# Patient Record
Sex: FEMALE | Race: WHITE | NOT HISPANIC OR LATINO | URBAN - METROPOLITAN AREA
[De-identification: names, ages, dates, MRNs, and addresses within clinical notes are randomized per-mention and may not be internally consistent; named-entity substitution may affect disease eponyms.]

---

## 2022-09-19 ENCOUNTER — DOCUMENTATION (OUTPATIENT)
Dept: PSYCHIATRY | Facility: CLINIC | Age: 57
End: 2022-09-19

## 2022-09-19 ENCOUNTER — TELEMEDICINE (OUTPATIENT)
Dept: BEHAVIORAL/MENTAL HEALTH CLINIC | Facility: CLINIC | Age: 57
End: 2022-09-19
Payer: MEDICARE

## 2022-09-19 DIAGNOSIS — F31.9 BIPOLAR AFFECTIVE DISORDER, REMISSION STATUS UNSPECIFIED (HCC): Primary | ICD-10-CM

## 2022-09-19 DIAGNOSIS — F10.10 MILD ALCOHOL USE DISORDER: ICD-10-CM

## 2022-09-19 PROCEDURE — 90834 PSYTX W PT 45 MINUTES: CPT | Performed by: PSYCHOLOGIST

## 2022-09-19 NOTE — PSYCH
Virtual Regular Visit    Verification of patient location:    Patient is located in the following state in which I hold an active license [place]      Assessment/Plan:    Problem List Items Addressed This Visit    None     Visit Diagnoses     Bipolar affective disorder, remission status unspecified ([facility] [address] )    -  Primary    Mild alcohol use disorder              Goals addressed in session: Goal 1       Reason for visit is No chief complaint on file  Encounter provider Amish Lira, PhD    Provider located at [address] [place] [address]  [phone]      Recent Visits  No visits were found meeting these conditions  Showing recent visits within past 7 days and meeting all other requirements  Today's Visits  Date Type Provider Dept   09/19/22 Telemedicine Amish Lira, PhD Marvintory Hartley today's visits and meeting all other requirements  Future Appointments  No visits were found meeting these conditions  Showing future appointments within next 150 days and meeting all other requirements       The patient was identified by name and date of birth  Marissa Sutton was informed that this is a telemedicine visit and that the [facility] and patient was informed that this is a secure, HIPAA-compliant platform  She agrees to proceed     My office door was closed  No one else was in the room  She acknowledged consent and understanding of privacy and security of the video platform  The patient has agreed to participate and understands they can discontinue the visit at any time  Patient is aware this is a billable service  D:  Reached client at home through [address] [place] [address]  This was my first meeting with this interesting, friendly and likable casually dressed woman who appeared her stated age of 62    Client reported that she has one brother, who she has distanced herself from because of his legal involvement and aggressive behavior  She reported that her mother  in 2016 and her father  in 1  She moved into the [place] to help care for her father, and stayed after he   She described her father as a hoarder and herself as a recovering hoarder  She related that she likes to do crafts, and is currently making [address], and showed me several pictures which she had either completed or was working on  Client shared that she has very little family support, but she did mention several friends  Some of her goals include increased exercise, increase social involvement, being able to use Trans Net, and managing her moods  A:  Client was friendly and talkative, casually dressed, and oriented x3  Speech was normal, mood somewhat anxious, affect congruent  Client reported that she suffered a stroke in 2018 and another stroke more recently  She reported that the stroke has made it more difficult for her to get out, make friends, work, or engage in any other social activities  P:  Client will return in 2 weeks  She agreed to pick one goal to accomplish in the upcoming week and will report on it at next session  She will meet with her peer specialist soon, is in the process of calling [facility] to find out if they have received the needed (and sent) paperwork  Gave her Codi Obando number as a resource for her questions  Will review treatment plan at next session  Subjective  Carlton Sunimer is a 62 y o  female who was friendly and cooperative  [facility]     No past medical history on file  No past surgical history on file  No current outpatient medications on file  No current facility-administered medications for this visit  Not on File    Review of Systems    Video Exam    There were no vitals filed for this visit      Physical Exam     I spent 50 minutes directly with the patient during this visit

## 2022-10-03 ENCOUNTER — TELEMEDICINE (OUTPATIENT)
Dept: BEHAVIORAL/MENTAL HEALTH CLINIC | Facility: CLINIC | Age: 57
End: 2022-10-03
Payer: MEDICARE

## 2022-10-03 DIAGNOSIS — F31.9 BIPOLAR AFFECTIVE DISORDER, REMISSION STATUS UNSPECIFIED (HCC): Primary | ICD-10-CM

## 2022-10-03 PROCEDURE — 90834 PSYTX W PT 45 MINUTES: CPT | Performed by: PSYCHOLOGIST

## 2022-10-03 NOTE — PSYCH
Virtual Regular Visit    Verification of patient location:    Patient is located in the following state in which [place] an [place]     Session started at 12:02  Session ended at 12:47  Used amwell embedded till it shut down  Tried to rejoin, but client could not see or hear me  Switched to phone  Client declined joining by [employer] now  Finished last 15-20 minutes by phone  D:  Reached client on Amwell embedded  It shut down after 25 minutes  Reported that her mom  in 2019, had stroke in 2018  Still grieving death of mom  After surgery went to [facility] - then came home with feeding tube  It was horrible  I was able to walk for the last graduation  But they didn't even call my name, just walked through by class  I am home a lot - I don't have a vehicle  I would like to get a job but first I need to learn to use some transportation and get out of this funk I find myself in      A:  "I'm in a bad way with everything "  I am depressed; I am not getting a lot of stuff done  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 Client will get a journal and begin to make list of coping skills, and things she would like to do  Assessment/Plan:    Problem List Items Addressed This Visit    None         Goals addressed in session: Goal 1          Reason for visit is No chief complaint on file         Encounter provider Paula Brown, PhD    Provider located at [facility] OUTPATIENT  [facility]  [phone]      Recent Visits  No visits were found meeting these conditions  Showing recent visits within past 7 days and meeting all other requirements  Future Appointments  No visits were found meeting these conditions  Showing future appointments within next 150 days and meeting all other requirements       The patient was identified by name and date of birth  Yesika Byers was informed that this is a telemedicine visit and that the visit is being conducted throughEpic Embedded and patient was informed this is a secure, HIPAA-complaint platform  She agrees to proceed     My office door was closed  No one else was in the room  She acknowledged consent and understanding of privacy and security of the video platform  The patient has agreed to participate and understands they can discontinue the visit at any time  Patient is aware this is a billable service  Subjective  Yesika Byers is a 62 y o  female Client was friendly and cooperative   HPI     No past medical history on file  No past surgical history on file  No current outpatient medications on file  No current facility-administered medications for this visit  Not on File    Review of Systems    Video Exam    There were no vitals filed for this visit      Physical Exam     I spent 45 minutes directly with the patient during this visit

## 2022-10-17 ENCOUNTER — TELEMEDICINE (OUTPATIENT)
Dept: BEHAVIORAL/MENTAL HEALTH CLINIC | Facility: CLINIC | Age: 57
End: 2022-10-17
Payer: COMMERCIAL

## 2022-10-17 DIAGNOSIS — F31.9 BIPOLAR AFFECTIVE DISORDER, REMISSION STATUS UNSPECIFIED (HCC): Primary | ICD-10-CM

## 2022-10-17 PROCEDURE — 90834 PSYTX W PT 45 MINUTES: CPT | Performed by: PSYCHOLOGIST

## 2022-10-17 NOTE — PSYCH
Visit Time    Visit Start Time: 1:00 PM  Visit Stop Time: 1:42 PM  Total Visit Duration: 42 minutes  Virtual Regular Visit    Verification of patient location:    Patient is located in the following state in which [place] an [place]     D:  Reached client on amwell now  Client reported that she has met her peer support and completed some paperwork  Talked a bit about what she wanted to do with her peer support,and decided that she would have her help with a phone call  Reported that since her stroke, it is harder for her to think, harder to find words, and she gets easily derailed when making important phone calls  Client talked about the [employer] and [employer]WD and is trying to keep her part time job  She would like to work more, but her anxiety and agoraphobia make this challenging  She showed me her newly completed [employer] and reported that she had worked on it 277 hours, it had 60 colors and over 1000 pieces  She has a new kit she will start soon  A:Client appeared somewhat depressed, mood low, affect congruent  Client was somewhat frustrated with girlfriend who was supposed to  her dog before the session started, but hadn't  Client was looking for her out the window, till friend called and said she was running late  Speech was normal in quantity, rate and volunme  Thought process was logical   No issues with SI or Hi  Insight and judgment appeared fair  P: Return in two weeks  Client will continue to make list of coping skills  She will go out the door twice per day (in addition to walking the dog) - stand outside for a minute, then go back in  This is a way to increase her comfort with being outside of her home  Assessment/Plan:    Problem List Items Addressed This Visit    None         Goals addressed in session: Goal 1          Reason for visit is No chief complaint on file         Encounter provider Anna Marie Price, PhD    Provider located at [facility] THERAPIST [facility]  [facility] [address] [facility] OUT[facility]IENT  [address] [address] [phone]      Recent Visits  No visits were found meeting these conditions  Showing recent visits within past 7 days and meeting all other requirements  Future Appointments  No visits were found meeting these conditions  Showing future appointments within next 150 days and meeting all other requirements       The patient was identified by name and date of birth  Kian Olmedo was informed that this is a telemedicine visit and that the visit is being conducted through[employer] and patient was informed that this is a secure, HIPAA-compliant platform  She agrees to proceed     My office door was closed  No one else was in the room  She acknowledged consent and understanding of privacy and security of the video platform  The patient has agreed to participate and understands they can discontinue the visit at any time  Patient is aware this is a billable service  Subjective  Kian Olmedo is a 62 y o  female client was friendly and cooperative   HPI     No past medical history on file  No past surgical history on file  No current outpatient medications on file  No current facility-administered medications for this visit  Not on File    Review of Systems    Video Exam    There were no vitals filed for this visit      Physical Exam     I spent 42 minutes directly with the patient during this visit

## 2022-10-31 ENCOUNTER — TELEMEDICINE (OUTPATIENT)
Dept: BEHAVIORAL/MENTAL HEALTH CLINIC | Facility: CLINIC | Age: 57
End: 2022-10-31

## 2022-10-31 DIAGNOSIS — F31.9 BIPOLAR AFFECTIVE DISORDER, REMISSION STATUS UNSPECIFIED (HCC): Primary | ICD-10-CM

## 2022-10-31 NOTE — PSYCH
Virtual Regular Visit    Verification of patient location:    Patient is located in the following state in [place] an [place]      Assessment/Plan:    Problem List Items Addressed This Visit    None     Visit Diagnoses     Bipolar affective disorder, remission status unspecified ([facility] [address] )    -  Primary          Goals addressed in session: Goal 1          Reason for visit is No chief complaint on file  Encounter provider Neena Proctor, PhD    Provider located at [address] [place] [address]  [phone]      Recent Visits  Date Type Provider Dept   10/31/22 Telemedicine Neena Proctor, PhD Skye López recent visits within past 7 days and meeting all other requirements  Future Appointments  No visits were found meeting these conditions  Showing future appointments within next 150 days and meeting all other requirements       The patient was identified by name and date of birth  Parveen Crawford was informed that this is a telemedicine visit and that the visit is being conducted throughthe [employer] platform  She agrees to proceed     My office door was closed  No one else was in the room  She acknowledged consent and understanding of privacy and security of the video platform  The patient has agreed to participate and understands they can discontinue the visit at any time  Patient is aware this is a billable service  Subjective  Parveen Crawford is a 62 y o  female     D:  Tried to reach client on GetThis  Unable to connect  Tried three times  Ended up using phone  Client reported that she completes some online surveys and is able to  Gain points/money  "I have surveys I complete using [employer]  I get points for completing the surveys - and over time   Survey Jonathan Gomez (I got $32)",    I have the [employer] book - with pictures   [employer]  - in color  I want to make some more recipes  But I don't have anyone to give the food to  And it is expensive to buy butter and chocolate chips  My food stamps went down - but I had stocked up on some staples "    Client was in pain, and struggled to stay focused  She reported that she sleeps a lot, and is cooking less and less  She rarely gets out of the house, even to visit grandkids  A:  I am on seroquel , 500 at night  I am feeling anxious again  I don't want to change my meds  Client was friendly and cooperative  She seemed lethargic and at times I had to repeat myself  She appeared to have some trouble answering some queries  Thought process appeared slowed  No issues with SI or HI         P:  Return in 3-4 weeks  Continue to use coping skills as needed  Reach out to  for support  Continue to use stretching, as she has in the past, to help manage moods and minimize pain issues  Sander Atkins HPI     No past medical history on file  No past surgical history on file  No current outpatient medications on file  No current facility-administered medications for this visit  Not on File    Review of Systems    Video Exam    There were no vitals filed for this visit      Physical Exam     Visit Time    Visit Start Time: 2:10  Visit Stop Time: 2:48  Total Visit Duration: 38 minutes

## 2022-11-02 PROBLEM — F31.31 BIPOLAR AFFECTIVE DISORDER, CURRENTLY DEPRESSED, MILD (HCC): Status: ACTIVE | Noted: 2022-11-02

## 2022-11-09 ENCOUNTER — TELEMEDICINE (OUTPATIENT)
Dept: PSYCHIATRY | Facility: CLINIC | Age: 57
End: 2022-11-09

## 2022-11-09 DIAGNOSIS — F31.81 BIPOLAR II DISORDER (HCC): Primary | ICD-10-CM

## 2022-11-09 DIAGNOSIS — F41.1 GENERALIZED ANXIETY DISORDER: ICD-10-CM

## 2022-11-09 RX ORDER — QUETIAPINE FUMARATE 400 MG/1
400 TABLET, FILM COATED ORAL
COMMUNITY
Start: 2022-10-18

## 2022-11-09 RX ORDER — ATORVASTATIN CALCIUM 40 MG/1
40 TABLET, FILM COATED ORAL DAILY
COMMUNITY
Start: 2022-09-25

## 2022-11-09 RX ORDER — QUETIAPINE FUMARATE 100 MG/1
100 TABLET, FILM COATED ORAL
COMMUNITY
Start: 2022-10-21

## 2022-11-09 RX ORDER — PROPRANOLOL HYDROCHLORIDE 20 MG/1
20 TABLET ORAL DAILY
COMMUNITY
Start: 2022-09-24

## 2022-11-09 RX ORDER — FLUTICASONE PROPIONATE 50 MCG
SPRAY, SUSPENSION (ML) NASAL
COMMUNITY
Start: 2022-09-16

## 2022-11-09 NOTE — PSYCH
Virtual Regular Visit    Verification of patient location:    Patient is located in the following [place]      Assessment/Plan:    Problem List Items Addressed This Visit    None     Visit Diagnoses     Bipolar II disorder ([facility] [address] )    -  Primary    Relevant Medications    QUEtiapine (SEROquel) 100 mg tablet    QUEtiapine (SEROquel) 400 MG tablet    Generalized anxiety disorder        Relevant Medications    QUEtiapine (SEROquel) 100 mg tablet    QUEtiapine (SEROquel) 400 MG tablet          Goals addressed in session: Goal 1          Reason for visit is   Chief Complaint   Patient presents with   • Medication Management        Encounter provider Kaleigh Atkins MD    Provider located at [address] [facility]  [address]  [phone]      Recent Visits  No visits were found meeting these conditions  Showing recent visits within past 7 days and meeting all other requirements  Today's Visits  Date Type Provider Dept   11/09/22 Telemedicine Kaleigh Atkins, [facility] today's visits and meeting all other requirements  Future Appointments  No visits were found meeting these conditions  Showing future appointments within next 150 days and meeting all other requirements       The patient was identified by name and date of birth  Kong Uribe was informed that this is a telemedicine visit and that the visit is being [facility]  She agrees to proceed     My office door was closed  No one else was in the room  She acknowledged consent and understanding of privacy and security of the video platform  The patient has agreed to participate and understands they can discontinue the visit at any time  Patient is aware this is a billable service  Subjective  Kong Uribe is a 62 y o  female with bipolar do presents for regular f/u     Compliant with meds, denies SE  Pt is upset about time saving and c/o feeling tired and depressed sometimes  Stomach virus last week  Not driving - friend helps with grocery shopping  Walks friend`s dog  Weigh stable 140 lbs      HPI   Mood - episodes of sadness, and low energy; mild mood swings - close to baseline  Anxiety - daily episodes of tension and GI spx; also anxiety at night - wakes up from " bad dreams"; takes propranolol 20 mg at night   History reviewed  No pertinent past medical history  History reviewed  No pertinent surgical history  Current Outpatient Medications   Medication Sig Dispense Refill   • propranolol (INDERAL) 20 mg tablet Take 20 mg by mouth daily     • QUEtiapine (SEROquel) 100 mg tablet Take 100 mg by mouth daily at bedtime     • QUEtiapine (SEROquel) 400 MG tablet Take 400 mg by mouth daily at bedtime     • atorvastatin (LIPITOR) 40 mg tablet Take 40 mg by mouth daily     • fluticasone (FLONASE) 50 mcg/act nasal spray SPRAY 1 SPRAY INTO EACH NOSTRIL EVERY DAY FOR 90 DAYS       No current facility-administered medications for this visit  Not on File    Review of Systems   Constitutional: Positive for appetite change (increased at night)  Negative for activity change  Psychiatric/Behavioral: Negative for sleep disturbance and suicidal ideas  The patient is nervous/anxious  Video Exam    There were no vitals filed for this visit  Physical Exam  Constitutional:       Appearance: Normal appearance  She is normal weight  Neurological:      Mental Status: She is alert  Psychiatric:         Attention and Perception: Perception normal          Mood and Affect: Mood is anxious  Affect is blunt  Speech: Speech normal          Behavior: Behavior normal  Behavior is cooperative  Thought Content:  Thought content normal          Judgment: Judgment normal           Visit Time    Visit Start Time: 1 41 pm  Visit Stop Time: 1 54 pm  Total Visit Duration: 20

## 2022-11-10 ENCOUNTER — TELEMEDICINE (OUTPATIENT)
Dept: BEHAVIORAL/MENTAL HEALTH CLINIC | Facility: CLINIC | Age: 57
End: 2022-11-10

## 2022-11-10 DIAGNOSIS — F10.10 MILD ALCOHOL USE DISORDER: ICD-10-CM

## 2022-11-10 DIAGNOSIS — F31.9 BIPOLAR AFFECTIVE DISORDER, REMISSION STATUS UNSPECIFIED (HCC): Primary | ICD-10-CM

## 2022-11-10 NOTE — PSYCH
Virtual Regular Visit    Verification of patient location:    Patient is located in the following [place] in [place]      Assessment/Plan:    Problem List Items Addressed This Visit    None     Visit Diagnoses     Bipolar affective disorder, remission status unspecified ([facility] [address] )    -  Primary    Mild alcohol use disorder              Goals addressed in session: Goal 1          Reason for visit is No chief complaint on file  Encounter provider Marilyn Saenz, PhD    Provider located at [address] [place] [id number] [phone]      Recent Visits  Date Type Provider Dept   11/10/22 Telemedicine Marilyn Saenz, PhD Reba Bullard recent visits within past 7 days and meeting all other requirements  Future Appointments  No visits were found meeting these conditions  Showing future appointments within next 150 days and meeting all other requirements       The patient was identified by name and date of birth  Ashley Uribe was informed that this is a telemedicine visit and that the visit is being conducted throughthe [employer] platform  She agrees to proceed     My office door was closed  No one else was in the room  She acknowledged consent and understanding of privacy and security of the video platform  The patient has agreed to participate and understands they can discontinue the visit at any time  Patient is aware this is a billable service  Subjective  Ashley Uribe is a 62 y o  female     D:  Reached client on [employer]  Stated that the other day there was some road construction which made it impossible for her to get together with her support person  I got [employer] for one month - then pause for three months- then you get it back again  So I pay for just one month   Client reported that she can't do half of the stuff that she used to do, due to her stroke and slow recovery  I got the [facility] [employer] Degree  Then the certificate in [address]  After the stroke, I was [place] [facility] [place] in with a hospice patient, then transferred to another room  My cat  (23 yrs old) while I was in the [facility]  That was a very difficult time  Client shared about how frustrating it is to read some of her papers from before the stroke - "they sound so good " But she reported that she is not able to perform at that level any longer  A:  "My anxiety is like crazy "  I don't feel like I can work now  "The stroke has made it harder for my brain to work- its not as good as it was before " Client reported that she is doing the brain games on her DS (stroke 2018)  I still have cognitive deficits - which haven't bounced back  Client appeared casually dressed and was friendly and cooperative  She was alert and oriented x3  speech was normal with rate, volume and quantity  Mood was slightly sad, affect appropriate to situation  Thought process was logical, some loose associations  No issues with si or hi  Insight and judgment were fair  P:  Find ways to use her cooking skills  Continue to reach out to family and friends for support  Continue to complete brain strengthening exercises regularly  Return in 2-3 weeks  Darius [place] HPI     No past medical history on file  No past surgical history on file  Current Outpatient Medications   Medication Sig Dispense Refill   • atorvastatin (LIPITOR) 40 mg tablet Take 40 mg by mouth daily     • fluticasone (FLONASE) 50 mcg/act nasal spray SPRAY 1 SPRAY INTO EACH NOSTRIL EVERY DAY FOR 90 DAYS     • propranolol (INDERAL) 20 mg tablet Take 20 mg by mouth daily     • QUEtiapine (SEROquel) 100 mg tablet Take 100 mg by mouth daily at bedtime     • QUEtiapine (SEROquel) 400 MG tablet Take 400 mg by mouth daily at bedtime       No current facility-administered medications for this visit  Not on File    Review of Systems    Video Exam    There were no vitals filed for this visit      Physical Exam     Visit Time  11/15/22  Start Time: 5331  Stop Time: 7069  Total Visit Time: 50 minutes

## 2022-12-05 ENCOUNTER — TELEMEDICINE (OUTPATIENT)
Dept: BEHAVIORAL/MENTAL HEALTH CLINIC | Facility: CLINIC | Age: 57
End: 2022-12-05

## 2022-12-05 DIAGNOSIS — F31.9 BIPOLAR AFFECTIVE DISORDER, REMISSION STATUS UNSPECIFIED (HCC): Primary | ICD-10-CM

## 2022-12-05 NOTE — BH TREATMENT PLAN
Abrahan Dickerson  1965       Date of Initial Treatment Plan: 12/5/2022  Date of Current Treatment Plan: 12/05/22    Treatment Plan Number 1    Strengths/Personal Resources for Self Care: I am a good friend, I am creative, I enjoy doing crafts, I am funny, I like to be busy, I am resourceful completing surveys, etc      Diagnosis:   1  Bipolar affective disorder, remission status unspecified ([facility]            Area of Needs: I have anxiety when I am going out of the house  And I have depression - I just don't feel motivated  Long Term Goal 1: A   I want to be able to manage my anxiety so that I can go out of the house  Target Date: N/A  Completion Date:   Review in six months         Short Term Objectives for Goal 1: A  I want to use good coing skills to manage my moods    Long Term Goal 2:   I want to do some exercise on my wII so that I can mange my moods and manage my pysical health  Target Date: N/A  Completion Date:    review in six months    Short Term Objectives for Goal 2: A  I want to spend 15 to 30 minutes on exercise three times per week  Long Term Goal # 3: N/A     Target Date: N/A  Completion Date: N/A    Short Term Objectives for Goal 3: Anone    GOAL 1: Modality: Individual 1-2 x per month   Completion Date Review in six months        [address]: Diagnosis and Treatment Plan explained to Marcy Whitley relates understanding diagnosis and is agreeable to Treatment Plan  Client Comments : Please share your thoughts, feelings, need and/or experiences regarding your treatment plan:  I need to stop cheating on my sugar  It sounds good  It sounds like  A lot but I just gotta do it
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
## 2022-12-19 ENCOUNTER — TELEMEDICINE (OUTPATIENT)
Dept: BEHAVIORAL/MENTAL HEALTH CLINIC | Facility: CLINIC | Age: 57
End: 2022-12-19

## 2022-12-19 DIAGNOSIS — F31.9 BIPOLAR AFFECTIVE DISORDER, REMISSION STATUS UNSPECIFIED (HCC): Primary | ICD-10-CM

## 2022-12-19 NOTE — PSYCH
Virtual Regular Visit  2  Verification of patient location:    Patient is located in the following state in which I [place] an [place] license [place]      Assessment/Plan:    Problem List Items Addressed This Visit    None  Visit Diagnoses     Bipolar affective disorder, remission status unspecified ([facility] [address] )    -  Primary          Goals addressed in session: Goal 1          Reason for visit is No chief complaint on file  Encounter provider Chanel Bradford, PhD    Provider located at [address] [place] [id number] [phone]      Recent Visits  Date Type Provider Dept   12/19/22 Telemedicine Chanel Bradford, PhD Karina Pierre recent visits within past 7 days and meeting all other requirements  Future Appointments  No visits were found meeting these conditions  Showing future appointments within next 150 days and meeting all other requirements       The patient was identified by name and date of birth  Charley Mendoza was informed that this is a telemedicine visit and that the [facility]  She agrees to proceed     My office door was closed  No one else was in the room  She acknowledged consent and understanding of privacy and security of the video platform  The patient has agreed to participate and understands they can discontinue the visit at any time  I have a list of repairs to do in the house    Patient is aware this is a billable service  Subjective  Charley Mendoza is a 62 y o  female     D:Reached client on [employer] embedded  Client stated that she is ready for Tiff to be done  She has a new kit for [employer] - which is harder to work with  Client was calm and friendly    She talked about her social anxiety and how proud she is of herself for 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  She stated that, when the shop is busy, she is more easily distracted and anxious  But so far, she has been able to complete her job assignment without too much trouble  Client reported that watching her friend's dog has helped ease her social anxiety and she enjoys taking him for a walk  Provided empathy and support  Encouraged her to reach out to friends and family for support as needed  A;  Client was friendly and cooperative  She was talkative, upbeat, and engaging  Mood anxious, sad, missing family members who have   Some trouble with concentration  Smokes cigs - 15/day- I would like to quit  I have the patches in there for when I am ready to quit  I quit for 2 years a couple years ago, and then my mom go sick, and  and other family members   No alcohol  Medical Marijuana card- smoking- for anxiety - prn  I am still really anxious  I have social anxiety when I go out somewhere  At night, I have anxiety symptoms - but I am over menopause  I am better when I have the dog and then I can go out easier  I have anxiety about the maintenance men  P :Return in 2-3 weeks  Continue to stay involved with friends  Continue to focus on going out of the house more, so that she can manage her anxiety  Malissa BENITO     No past medical history on file  No past surgical history on file  Current Outpatient Medications   Medication Sig Dispense Refill   • atorvastatin (LIPITOR) 40 mg tablet Take 40 mg by mouth daily     • fluticasone (FLONASE) 50 mcg/act nasal spray SPRAY 1 SPRAY INTO EACH NOSTRIL EVERY DAY FOR 90 DAYS     • propranolol (INDERAL) 20 mg tablet Take 20 mg by mouth daily     • QUEtiapine (SEROquel) 100 mg tablet Take 100 mg by mouth daily at bedtime     • QUEtiapine (SEROquel) 400 MG tablet Take 400 mg by mouth daily at bedtime       No current facility-administered medications for this visit          Not on File    Review of Systems    Video Exam    There were no vitals filed for this visit      Physical Exam     Visit Time    12/20/22  Start Time: 1400  Stop Time: 1043  Total Visit Time: 46 minutes

## 2022-12-21 DIAGNOSIS — F41.1 GENERALIZED ANXIETY DISORDER: Primary | ICD-10-CM

## 2022-12-21 RX ORDER — PROPRANOLOL HYDROCHLORIDE 20 MG/1
20 TABLET ORAL DAILY
Qty: 14 TABLET | Refills: 0 | Status: SHIPPED | OUTPATIENT
Start: 2022-12-21

## 2022-12-21 NOTE — TELEPHONE ENCOUNTER
Medication Refill Request     Name of Medication propranolol  Dose/Frequency 20mg 1 x a day  Quantity 90 tabs  Verified pharmacy   [x]  Verified ordering Provider   [x]  Does patient have enough for the next 3 days? Yes [x] No [] has 5 left  Does patient have a follow-up appointment scheduled?  Yes [x] No []   If so when is appointment: [email or web address]

## 2022-12-30 ENCOUNTER — TELEPHONE (OUTPATIENT)
Dept: PSYCHIATRY | Facility: CLINIC | Age: 57
End: 2022-12-30

## 2023-01-03 ENCOUNTER — TELEMEDICINE (OUTPATIENT)
Dept: PSYCHIATRY | Facility: CLINIC | Age: 58
End: 2023-01-03

## 2023-01-03 DIAGNOSIS — F31.81 BIPOLAR II DISORDER (HCC): Primary | ICD-10-CM

## 2023-01-03 DIAGNOSIS — F41.1 GENERALIZED ANXIETY DISORDER: ICD-10-CM

## 2023-01-03 RX ORDER — QUETIAPINE FUMARATE 400 MG/1
400 TABLET, FILM COATED ORAL
Qty: 90 TABLET | Refills: 0 | Status: SHIPPED | OUTPATIENT
Start: 2023-01-03

## 2023-01-03 RX ORDER — QUETIAPINE FUMARATE 100 MG/1
100 TABLET, FILM COATED ORAL
Qty: 90 TABLET | Refills: 0 | Status: SHIPPED | OUTPATIENT
Start: 2023-01-03

## 2023-01-03 RX ORDER — ALBUTEROL SULFATE 90 UG/1
AEROSOL, METERED RESPIRATORY (INHALATION)
COMMUNITY
Start: 2022-11-30

## 2023-01-03 RX ORDER — PROPRANOLOL HYDROCHLORIDE 10 MG/1
10 TABLET ORAL 2 TIMES DAILY
Qty: 180 TABLET | Refills: 0 | Status: SHIPPED | OUTPATIENT
Start: 2023-01-03

## 2023-01-03 NOTE — PSYCH
Virtual Regular Visit    Verification of patient location:    Patient is located in the following state in which I [place] an [place] license [place]      Assessment/Plan:    Problem List Items Addressed This Visit    None  Visit Diagnoses     Bipolar II disorder ([facility] [address] )    -  Primary    Relevant Medications    QUEtiapine (SEROquel) 400 MG tablet    QUEtiapine (SEROquel) 100 mg tablet    Generalized anxiety disorder        Relevant Medications    QUEtiapine (SEROquel) 400 MG tablet    QUEtiapine (SEROquel) 100 mg tablet    propranolol (INDERAL) 10 mg tablet          Goals addressed in session: Goal 1          Reason for visit is   Chief Complaint   Patient presents with   • Medication Management        Encounter provider Lupe Hamlin MD    Provider located at [address] [facility]  [address]  [phone]      Recent Visits  Date Type Provider Dept   12/30/22 Telephone Lupe Hamlin  [address] recent visits within past 7 days and meeting all other requirements  Today's Visits  Date Type Provider Dept   01/03/23 Telemedicine Lupe Hamlin  [address] [place] today's visits and meeting all other requirements  Future Appointments  No visits were found meeting these conditions  Showing future appointments within next 150 days and meeting all other requirements       The patient was identified by name and date of birth  Robert Eng was informed that this is a telemedicine visit and [facility] the [facility]  She agrees to proceed     My office door was closed  No one else was in the room  She acknowledged consent and understanding of privacy and security of the video platform  The patient has agreed to participate and understands they can discontinue the visit at any time  Patient is aware this is a billable service  Subjective  Robert Eng is a 62 y o  female with mood and anxiety presents for regular f/u    Compliant with meds; SE night sweats (? Seroquel? - unclear)  Denies medical problems      HPI   Mood - feeling depressed " on and off"; sometimes low energy; does ADLs  Anxiety - increased racing thoughts; difficult to fall asleep    History reviewed  No pertinent past medical history  History reviewed  No pertinent surgical history  Current Outpatient Medications   Medication Sig Dispense Refill   • propranolol (INDERAL) 10 mg tablet Take 1 tablet (10 mg total) by mouth 2 (two) times a day 180 tablet 0   • QUEtiapine (SEROquel) 100 mg tablet Take 1 tablet (100 mg total) by mouth daily at bedtime 90 tablet 0   • QUEtiapine (SEROquel) 400 MG tablet Take 1 tablet (400 mg total) by mouth daily at bedtime 90 tablet 0   • albuterol (PROVENTIL HFA,VENTOLIN HFA) 90 mcg/act inhaler INHALE 1 PUFF EVERY 4 TO 6 HOURS AS NEEDED     • atorvastatin (LIPITOR) 40 mg tablet Take 40 mg by mouth daily     • fluticasone (FLONASE) 50 mcg/act nasal spray SPRAY 1 SPRAY INTO EACH NOSTRIL EVERY DAY FOR 90 DAYS       No current facility-administered medications for this visit  Not on File    Review of Systems   Constitutional: Negative for activity change and appetite change  Psychiatric/Behavioral: Positive for sleep disturbance (difficult to fall alseep)  Negative for suicidal ideas  The patient is nervous/anxious  Video Exam    There were no vitals filed for this visit  Physical Exam  Constitutional:       Appearance: Normal appearance  She is normal weight  Neurological:      Mental Status: She is alert  Psychiatric:         Attention and Perception: Attention and perception normal          Mood and Affect: Mood is anxious  Affect is blunt  Speech: Speech normal          Behavior: Behavior normal  Behavior is cooperative  Thought Content:  Thought content normal          Judgment: Judgment normal           Visit Time    Visit Start Time: 2 57 pm   Visit Stop Time: 3 07 pm   Total Visit Duration: 19 minutes

## 2023-01-04 ENCOUNTER — TELEMEDICINE (OUTPATIENT)
Dept: BEHAVIORAL/MENTAL HEALTH CLINIC | Facility: CLINIC | Age: 58
End: 2023-01-04

## 2023-01-04 DIAGNOSIS — F31.9 BIPOLAR AFFECTIVE DISORDER, REMISSION STATUS UNSPECIFIED (HCC): ICD-10-CM

## 2023-01-04 DIAGNOSIS — F10.10 MILD ALCOHOL USE DISORDER: Primary | ICD-10-CM

## 2023-01-04 NOTE — PSYCH
Virtual Regular Visit    Verification of patient location:    Patient is located in the following state in which I [place] an [place]      Assessment/Plan:    Problem List Items Addressed This Visit    None  Visit Diagnoses     Mild alcohol use disorder    -  Primary    Bipolar affective disorder, remission status unspecified ([facility] )              Goals addressed in session: Goal 1          Reason for visit is No chief complaint on file  Encounter provider Yisel Parsons, PhD    Provider located at [address] [place], [place] [phone]      Recent Visits  Date Type Provider Dept   01/04/23 Telemedicine Yisel Parsons, PhD [facility] Danger recent visits within past 7 days and meeting all other requirements  Future Appointments  No visits were found meeting these conditions  Showing future appointments within next 150 days and meeting all other requirements       The patient was identified by name and date of birth  Kian Olmedo was informed that this is a telemedicine visit and that the visit is being conducted throughthe [facility]  She agrees to proceed     My office door was closed  No one else was in the room  She acknowledged consent and understanding of privacy and security of the video platform  The patient has agreed to participate and understands they can discontinue the visit at any time  Patient is aware this is a billable service  Subjective  Kian Olmedo is a 62 y o  female     D: Reached on [facility] embedded  "Holidays are over - I am glad of that  I went with Jessika Paiz and Yesi Ernandez to [employer] on [place] Day  New Year's Verónica I just watched to dog  I am shopping at [employer] now "  Client talked about her [employer] kits  She had ordered some supplies on Dec 10- which were delayed    "I just got them today "  She expressed some frustration, but overall, was able to cope well, call the company and explained that the items were missing  It was tracked to lack of staffing at the post office where they were delivered  "I have had a lot of therapists  I always need to explain that my mom was  5 times and we moved a lot "   "I have some african violets that were my Mom Moms"  Client was relaxed and cheerful during the visit today  She related that she is getting out with her friend and able to make a little extra cash for helping her friend  Explained that she feels 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  Generally this goes well  There was one time during the holidays when the store was very busy and customers were trying to talk with her as she was counting  This was frustrating and distracting  She could feel herself getting anxious  However she was able to continue working and did not have a panic attack  Celebrated this victory and explore the steps she had taken to help her keep calm and going about her business  A:  I am going to  my meds from [facility]  Client was cheerful and friendly, talkative, positive  Mood was good and affect upbeat  Client reported that "As soon as the holiday was over, I started feeling better "  Thought process was logical and goal directed  No issues with Si or HI  Since the stroke I have a hard time remembering names or words  I need to do my colonoscopy  I am feeling anxious about this  However, I am not really worried - just anxious about the process of scheduling, taking the meds, going thorough the process  P:  Return in 2 weeks  Continue to use art as a way to manage anxiety and depression  Reach out to family and friends as needed  Continue to add to her coping skills list         HPI     No past medical history on file  No past surgical history on file      Current Outpatient Medications   Medication Sig Dispense Refill   • albuterol (PROVENTIL HFA,VENTOLIN HFA) 90 mcg/act inhaler INHALE 1 PUFF EVERY 4 TO 6 HOURS AS NEEDED     • atorvastatin (LIPITOR) 40 mg tablet Take 40 mg by mouth daily     • fluticasone (FLONASE) 50 mcg/act nasal spray SPRAY 1 SPRAY INTO EACH NOSTRIL EVERY DAY FOR 90 DAYS     • propranolol (INDERAL) 10 mg tablet Take 1 tablet (10 mg total) by mouth 2 (two) times a day 180 tablet 0   • QUEtiapine (SEROquel) 100 mg tablet Take 1 tablet (100 mg total) by mouth daily at bedtime 90 tablet 0   • QUEtiapine (SEROquel) 400 MG tablet Take 1 tablet (400 mg total) by mouth daily at bedtime 90 tablet 0     No current facility-administered medications for this visit  Not on File    Review of Systems    Video Exam    There were no vitals filed for this visit      Physical Exam     Visit Time    01/09/23  Start Time: 4157  Stop Time: 0432  Total Visit Time: 44 minutes

## 2023-01-12 ENCOUNTER — TELEPHONE (OUTPATIENT)
Dept: GASTROENTEROLOGY | Facility: CLINIC | Age: 58
End: 2023-01-12

## 2023-01-12 NOTE — TELEPHONE ENCOUNTER
[facility] Assessment    Name: Abrahan Ochoa  YOB: 1965  Last Height: 5'2  Last weight: 130lb  BMI: 23 8  Procedure: Colonoscopy   Diagnosis: Hx of Polyps  Date of procedure: 03/02/2023  Prep: to be review by    Responsible : Silviano Caraballo   Phone#: [phone]  Name completing form: Baldemar Velez  Date form completed: 01/12/23      If the patient answers yes to any of these questions, schedule in a hospital  Are you pregnant: No  Do you rely on a wheelchair for mobility: No  Have you been diagnosed with End Stage Renal Disease (ESRD): No  Do you need oxygen during the day: No  Have you had a heart attack or stroke within the past three months: No  Have you had a seizure within the past three months: No  Have you ever been informed by anesthesia that you have a difficult airway: No  Additional Questions  Have you had any cardiac testing or are under the care of a Cardiologist (see cardiac list): No  Cardiac list:   Do you have an implanted cardiac defibrillator: No (Comment:  This patient should be scheduled in the hospital)    Have any bleeding problems, such as anemia or hemophilia (If patient has H&H result below 8, schedule in hospital   H&H must be within 30 days of procedure): No    Had an organ transplant within the past 3 months: No    Do you have any present infections: No  Do you get short of breath when walking a few blocks: No  Have you been diagnosed with diabetes: No  Comments (provide cardiac provider information if applicable):

## 2023-01-12 NOTE — TELEPHONE ENCOUNTER
01/12/23  Screened by: Kiya Wheeler    Referring Provider     Pre- Screening: There is no height or weight on file to calculate BMI  Has patient been referred for a routine screening Colonoscopy? yes  Is the patient between 39-70 years old? yes      Previous Colonoscopy yes   If yes:    Date: 2018 Dr Luzmaria Mishra:     Reason:       SCHEDULING STAFF: If the patient is between 39yrs-47yrs, please advise patient to confirm benefits/coverage with their insurance company for a routine screening colonoscopy, some insurance carriers will only cover at [address] or older  If the patient is over 66years old, please schedule an office visit  Does the patient want to see a Gastroenterologist prior to their procedure OR are they having any GI symptoms? no    Has the patient been hospitalized or had abdominal surgery in the past 6 months? no    Does the patient use supplemental oxygen? no    Does the patient take Coumadin, Lovenox, Plavix, Elliquis, Xarelto, or other blood thinning medication? no    Has the patient had a stroke, cardiac event, or stent placed in the past year? no    SCHEDULING STAFF: If patient answers NO to above questions, then schedule procedure  If patient answers YES to above questions, then schedule office appointment  Patient passed OA  If patient is between 45yrs - 49yrs, please advise patient that we will have to confirm benefits & coverage with their insurance company for a routine screening colonoscopy  FYI - patient evaluated in  11/30/2020 for dry cough x 3 months.   She declined CXR, awaiting COVID-19 PCR results.   Did have nasal congestion and PND - Flonase Rx'd.   Patient trialed herself off Coreg as she was told that the Coreg may be causing her chronic cough.   She was advised against self discontinuing anti-hypertensives without discussing with you & she was encouraged to reach out to your office regarding her Coreg concerns.     Very Respectfully,  MELANIA Snyder PA-C

## 2023-01-12 NOTE — TELEPHONE ENCOUNTER
Scheduled date of colonoscopy (as of today): 03/02/2023  Physician performing colonoscopy: Dr Catie Weeks  Location of colonoscopy: [facility]  Clearances: n/a

## 2023-01-18 ENCOUNTER — TELEMEDICINE (OUTPATIENT)
Dept: BEHAVIORAL/MENTAL HEALTH CLINIC | Facility: CLINIC | Age: 58
End: 2023-01-18

## 2023-01-18 DIAGNOSIS — F31.31 BIPOLAR AFFECTIVE DISORDER, CURRENTLY DEPRESSED, MILD (HCC): Primary | ICD-10-CM

## 2023-01-18 NOTE — PSYCH
Virtual Regular Visit    Verification of patient location:    Patient is located in the following state in which I hold an active license [place]      Assessment/Plan:    Problem List Items Addressed This Visit        Other    Bipolar affective disorder, currently depressed, mild ([facility] [address] ) - Primary       Goals addressed in session: Goal 1          Reason for visit is   Chief Complaint   Patient presents with   • Virtual Regular Visit        Encounter provider Fanny Hardin, PhD    Provider located at [address] [place] [address]  [phone]      Recent Visits  Date Type Provider Dept   01/18/23 Telemedicine Fanny Hardin, PhD Anthony Chino recent visits within past 7 days and meeting all other requirements  Future Appointments  No visits were found meeting these conditions  Showing future appointments within next 150 days and meeting all other requirements       The patient was identified by name and date of birth  Med Torre was informed that this is a telemedicine visit and that the visit is being conducted throughthe [employer] platform  She agrees to proceed     My office door was closed  No one else was in the room  She acknowledged consent and understanding of privacy and security of the video platform  The patient has agreed to participate and understands they can discontinue the visit at any time  Patient is aware this is a billable service  Subjective  Med Torre is a 62 y o  female who was friendly and cooperative   HPI     No past medical history on file  No past surgical history on file      Current Outpatient Medications   Medication Sig Dispense Refill   • albuterol (PROVENTIL HFA,VENTOLIN HFA) 90 mcg/act inhaler INHALE 1 PUFF EVERY 4 TO 6 HOURS AS NEEDED     • atorvastatin (LIPITOR) 40 mg tablet Take 40 mg by mouth daily     • fluticasone (FLONASE) 50 mcg/act nasal spray SPRAY 1 SPRAY INTO EACH NOSTRIL EVERY DAY FOR 90 DAYS     • propranolol (INDERAL) 10 mg tablet Take 1 tablet (10 mg total) by mouth 2 (two) times a day 180 tablet 0   • QUEtiapine (SEROquel) 100 mg tablet Take 1 tablet (100 mg total) by mouth daily at bedtime 90 tablet 0   • QUEtiapine (SEROquel) 400 MG tablet Take 1 tablet (400 mg total) by mouth daily at bedtime 90 tablet 0     No current facility-administered medications for this visit  Not on File    Review of Systems    Video Exam    There were no vitals filed for this visit  Behavioral Health Psychotherapy Progress Note    Psychotherapy Provided: Individual Psychotherapy     1  Bipolar affective disorder, currently depressed, mild ([facility] [address] )            Goals addressed in session: Goal 1     D:  "Reached client on amwell now  She reported that she is diligently finding small ways to earn a few extra dollars  One way includes 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 - they give me points for each add and then I get a GC for every 2000 points  "  The gift card is worth $20     Yaneth Garcia and I got our taxes done at the same time last year  My post office has "informed delivery" and it shows me everything that is coming in today's mail  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  Client reported that she doesn't have a car, which makes getting around much more difficult  She expressed gratitude to Yaneth Garcia for driving her places and expressed enjoyment (to some degree) in helping count the papers  She reported that she still feels anxious going into stores which are busy, but she is proud of herself for being able to push through her anxiety  Has a [place] [employer] Account (charge $11/quarter)      A:  Scheduled my coloscopy, dexa bone density scan, Mammogram, low dose chest CAT scan for my lungs  I was sick the last two years  Client was friendly and coopeartive    Mood was good and affect was congruent  Thought process was logical and goal directed  Client expressed appreciation for many good things in her life  No issues with/Si or HI         P:  Return in 2-3 weeks  Continue to practice learned coping skills  Reach out to friends for support as needed  DATA: Client was friendly and coopeartive  During this session, this clinician used the following therapeutic modalities: Supportive Psychotherapy    Substance Abuse was addressed during this session  If the client is diagnosed with a co-occurring substance use disorder, please indicate any changes in the frequency or amount of use: smoke cigarettes 15/day, medical [employer]" card  Stage of change for addressing substance use diagnoses: Contemplation    ASSESSMENT:  Adi Brunner presents with a Euthymic/ normal and Anxious mood  Seroquel is helping me sleep  Bed 11 pm - 2 am up at 11 or 12 noon  Client repots having anxiety - light headed, sick to my stomach when stressed  I have negative self talk  Might have bad dreams, wake up in panic, sweaty at night  Might hyper ventilate or might hold my breath  Some times worrying about going out of the house triggers anxiety  Sometimes my anxiety is irrational    Twelve places to go to then [employer] to recycle the papers  I always have to check my count  her affect is Normal range and intensity, which is congruent, with her mood and the content of the session  The client has made progress on their goals  Client was reached on amwell now  Adi Brunner presents with a minimal risk of suicide, minimal risk of self-harm, and minimal risk of harm to others  For any risk assessment that surpasses a "low" rating, a safety plan must be developed  A safety plan was indicated: no  If yes, describe in detail Reviewed coping skills   Reach out to family and friends for support    PLAN: Between sessions, Adi Brunner will Continue to practice her learned coping skills and reach out to family and friends for support as needed  Continue to make notes in her Counseling Journal and bring any concerns that arise to the next session    At the next session, the therapist will use Solution-Focused Therapy to address problem areas, anxiety and any symptoms of depression  Behavioral Health Treatment Plan and Discharge Planning: Orlando Persaud is aware of and agrees to continue to work on their treatment plan  They have identified and are working toward their discharge goals   yes    Visit start and stop times:    01/23/23  Start Time: 2435  Stop Time: 1356  Total Visit Time: 51 minutes    Physical Exam

## 2023-02-01 ENCOUNTER — TELEMEDICINE (OUTPATIENT)
Dept: BEHAVIORAL/MENTAL HEALTH CLINIC | Facility: CLINIC | Age: 58
End: 2023-02-01

## 2023-02-01 DIAGNOSIS — F31.31 BIPOLAR AFFECTIVE DISORDER, CURRENTLY DEPRESSED, MILD (HCC): Primary | ICD-10-CM

## 2023-02-01 NOTE — PSYCH
Behavioral Health Psychotherapy Progress Note    Psychotherapy Provided: Individual Psychotherapy     1  Bipolar affective disorder, currently depressed, mild ([address] )            Goals addressed in session: Goal 1     DATA:     Reached client on amwell now  She shared that her friend, wants to sell his car for $3800, the car has 175K miles on it  She is anxious about paying for the car and then it needing work  She wants to be able to have transportation, but she knows how expensive vehicles can be and she is not sure that this is the right move for her  She has been working on the [employer] for her current home  She reported that the heater is old, needs to convert to propane and this might cost money as well  Provided empathy and support  Helped client explore her feelings about needing/wanting transportation, versus the added expense and responsibility  She expressed gratitude for the session  Reviewed learned coping skills and explored support systems  During this session, this clinician used the following therapeutic modalities: Cognitive Behavioral Therapy    Substance Abuse was not addressed during this session  If the client is diagnosed with a co-occurring substance use disorder, please indicate any changes in the frequency or amount of use: NONE  Stage of change for addressing substance use diagnoses: Maintenance    ASSESSMENT:  Jose A Harper presents with a Euthymic/ normal / anxious mood  Client reported that she is worried about purchasing a car and hoping that it does not immediately need a lot of repairs  Client stated that she is anxious and uses her [employer]ño art kit to help her manage her anxiety     her affect is Normal range and intensity, which is congruent, with her mood and the content of the session  The client has made progress on their goals  Jose A Harper presents with a minimal risk of suicide, minimal risk of self-harm, and minimal risk of harm to others      For any risk assessment that surpasses a "low" rating, a safety plan must be developed  A safety plan was indicated: no  If yes, describe in detail Will reach out to friends and family as needed for support  PLAN:   Between sessions, Lucina Soto will continue to use her coping skills including her art projects as ways to stay busy and manage her stressors and loneliness  Continue to reach out to friends for companionship and support  Continue to stay involved with her small job as a way to decrease her social anxiety and find ways to meaningfully leave her home and engage with larger society  Continue to be helpful to others as she is willing and able  At the next session, the therapist will use Cognitive Behavioral Therapy to address coping skills to help client manage ongoing stressors in her life, manage moods, especially her anxiety and help her maintain stable independent functioning  Behavioral Health Treatment Plan and Discharge Planning: Lucina Soto is aware of and agrees to continue to work on their treatment plan  They have identified and are working toward their discharge goals   yes    Visit start and stop times:    02/08/23  Start Time: 1402  Stop Time: 1444  Total Visit Time: 42 minutes    Virtual Regular Visit    Verification of patient location:    Patient is located in the following state in which I hold an active license [place]      Assessment/Plan:    Problem List Items Addressed This Visit        Other    Bipolar affective disorder, currently depressed, mild ([facility] [address] ) - Primary       Goals addressed in session: Goal 1          Reason for visit is   Chief Complaint   Patient presents with   • Virtual Regular Visit        Encounter provider Gayathri Swartz, PhD    Provider located at [address] [place] [address] [address]  [address] [place] [address]  [phone]      Recent Visits  Date Type Provider Dept   02/01/23 Telemedicine [employer] Black, PhD Octaviano Cutler recent visits within past 7 days and meeting all other requirements  Future Appointments  No visits were found meeting these conditions  Showing future appointments within next 150 days and meeting all other requirements       The patient was identified by name and date of birth  Julianne Baker was informed that this is a telemedicine visit and that the visit is being conducted throughthe [employer] platform  She agrees to proceed     My office door was closed  No one else was in the room  She acknowledged consent and understanding of privacy and security of the video platform  The patient has agreed to participate and understands they can discontinue the visit at any time  Patient is aware this is a billable service  Subjective  Julianne Baker is a 62 y o  female was friendly and cooperative  HPI     No past medical history on file  No past surgical history on file  Current Outpatient Medications   Medication Sig Dispense Refill   • albuterol (PROVENTIL HFA,VENTOLIN HFA) 90 mcg/act inhaler INHALE 1 PUFF EVERY 4 TO 6 HOURS AS NEEDED     • atorvastatin (LIPITOR) 40 mg tablet Take 40 mg by mouth daily     • fluticasone (FLONASE) 50 mcg/act nasal spray SPRAY 1 SPRAY INTO EACH NOSTRIL EVERY DAY FOR 90 DAYS     • propranolol (INDERAL) 10 mg tablet Take 1 tablet (10 mg total) by mouth 2 (two) times a day 180 tablet 0   • QUEtiapine (SEROquel) 100 mg tablet Take 1 tablet (100 mg total) by mouth daily at bedtime 90 tablet 0   • QUEtiapine (SEROquel) 400 MG tablet Take 1 tablet (400 mg total) by mouth daily at bedtime 90 tablet 0     No current facility-administered medications for this visit  Not on File    Review of Systems    Video Exam    There were no vitals filed for this visit      Physical Exam     Visit Time  02/08/23  Start Time: 1031  Stop Time: 6287  Total Visit Time: 42 minutes

## 2023-02-10 ENCOUNTER — TELEPHONE (OUTPATIENT)
Dept: PSYCHIATRY | Facility: CLINIC | Age: 58
End: 2023-02-10

## 2023-02-10 NOTE — TELEPHONE ENCOUNTER
Outreach call place to verify Medicare coverage    Left message requesting call back at [phone]

## 2023-02-15 ENCOUNTER — TELEMEDICINE (OUTPATIENT)
Dept: BEHAVIORAL/MENTAL HEALTH CLINIC | Facility: CLINIC | Age: 58
End: 2023-02-15

## 2023-02-15 DIAGNOSIS — F31.31 BIPOLAR AFFECTIVE DISORDER, CURRENTLY DEPRESSED, MILD (HCC): Primary | ICD-10-CM

## 2023-02-15 NOTE — PSYCH
Behavioral Health Psychotherapy Progress Note    Psychotherapy Provided:  Individual     1  Bipolar affective disorder, currently depressed, mild ([address] )            Goals addressed in session: Goal 1     DATA:     Reached client on amwell now  I was able to get the car my friend wanted to sell me  It passed inspection on Tuesday  Basic car wash for $9   Will be making payments to Marquis Hough  Found a request for $500 from her [employer] account  It was cancelled, but I want to call "[employer]pal and make sure  Filled out most of her tax forms and gathered her documents  Tuesday she has three tests at [facility]  Colonoscopy on March 2nd  Client shared some of the stressors that she has been experiencing  Reported that she is happy but also a little bit anxious about having the car  She is aware of the extra expenses, but is also glad for transportation  She reported that she was doing well today, but had been a little under the weather last week and had not done much  Provided empathy and support  Celebrated getting a new car and her new found independence  Explored barriers to success and talked about boundaries with giving rides to others  Client continues to use her learned coping skills, which seem to be helping her manage anxious feelings  During this session, this clinician used the following therapeutic modalities: Cognitive Behavioral Therapy    Substance Abuse was not addressed during this session  If the client is diagnosed with a co-occurring substance use disorder, please indicate any changes in the frequency or amount of use: None  Stage of change for addressing substance use diagnoses: No substance use/Not applicable    ASSESSMENT:  Oleg Diez presents with a Euthymic/ normal and Dysthymic mood  Client appears a bit sad in session  reported that she is getting over a virus    Otherwise, is doing well       her affect is Normal range and intensity, which is congruent, with her mood and the content of the session  The client has made progress on their goals  Mart Nunez presents with a minimal risk of suicide, minimal risk of self-harm, and minimal risk of harm to others  For any risk assessment that surpasses a "low" rating, a safety plan must be developed  A safety plan was indicated: no  If yes, describe in detail Reach out to family and friends as needed for support and empathy    PLAN: Between sessions, Mart Nunez will continue to use her art work as a coping mechanism  Encouraged her to set up boundaries for others to use her car, or ask for a ride    At the next session, the therapist will use Cognitive Behavioral Therapy to address issues of anxiety and agoraphobia  Behavioral Health Treatment Plan and Discharge Planning: Mart Nunez is aware of and agrees to continue to work on their treatment plan  They have identified and are working toward their discharge goals   yes    Visit start and stop times:    02/21/23  Start Time: 1401  Stop Time: 1430  Total Visit Time: 29 minutes         Virtual Regular Visit    Verification of patient location:    Patient is located in the following state in which I hold an active license [place]      Assessment/Plan:    Problem List Items Addressed This Visit        Other    Bipolar affective disorder, currently depressed, mild ([facility] [address] ) - Primary       Goals addressed in session: Goal 1          Reason for visit is   Chief Complaint   Patient presents with   • Virtual Regular Visit        Encounter provider Kinga Holly, PhD    Provider located at [address] [place] [address]  [phone]      Recent Visits  Date Type Provider Dept   02/15/23 Telemedicine Kinga Holly, PhD Demetris Hickey recent visits within past 7 days and meeting all other requirements  Future Appointments  No visits were found meeting these conditions  Showing future appointments within next 150 days and meeting all other requirements       The patient was identified by name and date of birth  Ashley Mcnamara was informed that this is a telemedicine visit and that the visit is being conducted throughthe [employer] platform  She agrees to proceed     My office door was closed  No one else was in the room  She acknowledged consent and understanding of privacy and security of the video platform  The patient has agreed to participate and understands they can discontinue the visit at any time  Patient is aware this is a billable service  Subjective  Ashley Mcnamara is a 62 y o  female who was friendly and cooperative   [facility]     No past medical history on file  No past surgical history on file  Current Outpatient Medications   Medication Sig Dispense Refill   • albuterol (PROVENTIL HFA,VENTOLIN HFA) 90 mcg/act inhaler INHALE 1 PUFF EVERY 4 TO 6 HOURS AS NEEDED     • atorvastatin (LIPITOR) 40 mg tablet Take 40 mg by mouth daily     • fluticasone (FLONASE) 50 mcg/act nasal spray SPRAY 1 SPRAY INTO EACH NOSTRIL EVERY DAY FOR 90 DAYS     • propranolol (INDERAL) 10 mg tablet Take 1 tablet (10 mg total) by mouth 2 (two) times a day 180 tablet 0   • QUEtiapine (SEROquel) 100 mg tablet Take 1 tablet (100 mg total) by mouth daily at bedtime 90 tablet 0   • QUEtiapine (SEROquel) 400 MG tablet Take 1 tablet (400 mg total) by mouth daily at bedtime 90 tablet 0     No current facility-administered medications for this visit  Not on File    Review of Systems    Video Exam    There were no vitals filed for this visit      Physical Exam     Visit Time    02/21/23  Start Time: 3358  Stop Time: 1430  Total Visit Time: 29 minutes

## 2023-02-16 ENCOUNTER — TELEPHONE (OUTPATIENT)
Dept: GASTROENTEROLOGY | Facility: CLINIC | Age: 58
End: 2023-02-16

## 2023-03-02 ENCOUNTER — ANESTHESIA (OUTPATIENT)
Dept: GASTROENTEROLOGY | Facility: AMBULATORY SURGERY CENTER | Age: 58
End: 2023-03-02

## 2023-03-02 ENCOUNTER — ANESTHESIA EVENT (OUTPATIENT)
Dept: GASTROENTEROLOGY | Facility: AMBULATORY SURGERY CENTER | Age: 58
End: 2023-03-02

## 2023-03-02 ENCOUNTER — HOSPITAL ENCOUNTER (OUTPATIENT)
Dept: GASTROENTEROLOGY | Facility: AMBULATORY SURGERY CENTER | Age: 58
Discharge: HOME | End: 2023-03-02

## 2023-03-02 VITALS
TEMPERATURE: 97.5 F | DIASTOLIC BLOOD PRESSURE: 86 MMHG | BODY MASS INDEX: 23.92 KG/M2 | HEART RATE: 86 BPM | OXYGEN SATURATION: 97 % | WEIGHT: 130 LBS | HEIGHT: 62 IN | RESPIRATION RATE: 25 BRPM | SYSTOLIC BLOOD PRESSURE: 156 MMHG

## 2023-03-02 DIAGNOSIS — Z86.010 HISTORY OF COLON POLYPS: ICD-10-CM

## 2023-03-02 RX ORDER — SODIUM CHLORIDE, SODIUM LACTATE, POTASSIUM CHLORIDE, CALCIUM CHLORIDE 600; 310; 30; 20 MG/100ML; MG/100ML; MG/100ML; MG/100ML
50 INJECTION, SOLUTION INTRAVENOUS CONTINUOUS
Status: DISCONTINUED | OUTPATIENT
Start: 2023-03-02 | End: 2023-03-06 | Stop reason: HOSPADM

## 2023-03-02 RX ORDER — PROPOFOL 10 MG/ML
INJECTION, EMULSION INTRAVENOUS AS NEEDED
Status: DISCONTINUED | OUTPATIENT
Start: 2023-03-02 | End: 2023-03-02

## 2023-03-02 RX ORDER — LIDOCAINE HYDROCHLORIDE 10 MG/ML
INJECTION, SOLUTION EPIDURAL; INFILTRATION; INTRACAUDAL; PERINEURAL AS NEEDED
Status: DISCONTINUED | OUTPATIENT
Start: 2023-03-02 | End: 2023-03-02

## 2023-03-02 RX ADMIN — PROPOFOL 30 MG: 10 INJECTION, EMULSION INTRAVENOUS at 12:34

## 2023-03-02 RX ADMIN — PROPOFOL 150 MG: 10 INJECTION, EMULSION INTRAVENOUS at 12:31

## 2023-03-02 RX ADMIN — LIDOCAINE HYDROCHLORIDE 50 MG: 10 INJECTION, SOLUTION EPIDURAL; INFILTRATION; INTRACAUDAL; PERINEURAL at 12:31

## 2023-03-02 RX ADMIN — PROPOFOL 20 MG: 10 INJECTION, EMULSION INTRAVENOUS at 12:38

## 2023-03-02 RX ADMIN — SODIUM CHLORIDE, SODIUM LACTATE, POTASSIUM CHLORIDE, CALCIUM CHLORIDE 50 ML/HR: 600; 310; 30; 20 INJECTION, SOLUTION INTRAVENOUS at 11:15

## 2023-03-02 RX ADMIN — PROPOFOL 50 MG: 10 INJECTION, EMULSION INTRAVENOUS at 12:43

## 2023-03-02 NOTE — H&P
History and Physical - [facility]  Hay Smith 62 y o  female MRN: [id number]    HPI: Hay Smith is a 62y o  year old female who presents for follow up colonoscopy - history of polyps     REVIEW OF SYSTEMS: Per the HPI, and otherwise unremarkable  Historical Information   Past Medical History:   Diagnosis Date   • Anxiety    • Bipolar disorder ([facility] [address] )    • COPD (chronic obstructive pulmonary disease) ([facility] )    • Hyperlipidemia    • PTSD (post-traumatic stress disorder)    • Seizures (HCC)      Past Surgical History:   Procedure Laterality Date   • CEREBRAL ANEURYSM REPAIR      X2   • COLONOSCOPY       Social History   Social History     Substance and Sexual Activity   Alcohol Use Yes   • Alcohol/week: 4 0 standard drinks   • Types: 4 Standard drinks or equivalent per week     Social History     Substance and Sexual Activity   Drug Use Not Currently   • Types: Methamphetamines, "Crack" cocaine    Comment: over 10 years ago     Social History     Tobacco Use   Smoking Status Every Day   • Packs/day: 0 50   • Years: 30 00   • Pack years: 15 00   • Types: Cigarettes   Smokeless Tobacco Never     History reviewed  No pertinent family history      Meds/Allergies       Current Outpatient Medications:   •  albuterol (PROVENTIL HFA,VENTOLIN HFA) 90 mcg/act inhaler  •  atorvastatin (LIPITOR) 40 mg tablet  •  fluticasone (FLONASE) 50 mcg/act nasal spray  •  propranolol (INDERAL) 10 mg tablet  •  QUEtiapine (SEROquel) 100 mg tablet  •  QUEtiapine (SEROquel) 400 MG tablet  •  polyethylene glycol (GOLYTELY) 4000 mL solution    Current Facility-Administered Medications:   •  lactated ringers infusion, 50 mL/hr, Intravenous, Continuous, 50 mL/hr at 03/02/23 1115    Allergies   Allergen Reactions   • Naproxen GI Intolerance   • Percocet [Oxycodone-Acetaminophen] GI Intolerance       Objective     /80   Pulse 86   Temp 97 5 °F (36 4 °C) (Temporal)   Resp 20   Ht 5' 2" (1 575 m)   Wt 59 kg (130 lb)   SpO2 96%   BMI 23 78 kg/m²     PHYSICAL EXAM    Gen: NAD AAOx3  Head: Normocephalic, Atraumatic  CV: S1S2 RRR no m/r/g  CHEST: Clear b/l no c/r/w  ABD: soft, +BS NT/ND  EXT: no edema    ASSESSMENT/PLAN:  This is a 62y o  year old female here for colonoscopy , and she is stable and optimized for her procedure

## 2023-03-02 NOTE — ANESTHESIA PREPROCEDURE EVALUATION
Procedure:  COLONOSCOPY    Relevant Problems   CARDIO   (+) Cerebral arterial aneurysm   (+) Hyperlipidemia      NEURO/PSYCH   (+) Anxiety   (+) Bipolar affective disorder, currently depressed, mild (HCC)   (+) PTSD (post-traumatic stress disorder)   (+) Seizures ([facility] [address] )- none since 2019      PULMONARY   (+) COPD (chronic obstructive pulmonary disease) ([facility] )     12/2018:  [facility]  SAH  secondary  to  L  P-comm  aneurysm  rupture s/p coil  embolization  and  PED embolization    7/31/19 Successful PED embolization of the R PComm aneurysm    2/25/20 Cerebral angiogram:  Left posterior communicating artery aneurysm isagain stable s/p coils and pipeline embolization  No residual filling of the aneurysm   2- No residual filling of the R PComm aneurysm s/p PED treatment  Physical Exam    Airway    Mallampati score: II  TM Distance: >3 FB  Neck ROM: full     Dental   No notable dental hx     Cardiovascular      Pulmonary      Other Findings        Anesthesia Plan  ASA Score- 3     Anesthesia Type- IV sedation with anesthesia with ASA Monitors  Additional Monitors:   Airway Plan:           Plan Factors-Exercise tolerance (METS): >4 METS  Chart reviewed  Patient summary reviewed  Patient is a current smoker  Patient instructed to abstain from smoking on day of procedure  Induction- intravenous  Postoperative Plan-     Informed Consent- Anesthetic plan and risks discussed with patient  I personally reviewed this patient with the CRNA  Discussed and agreed on the Anesthesia Plan with the CRNA  Amanda Johnson

## 2023-03-02 NOTE — ANESTHESIA POSTPROCEDURE EVALUATION
Post-Op Assessment Note    CV Status:  Stable  Pain Score: 0    Pain management: adequate     Mental Status:  Alert and awake   Hydration Status:  Euvolemic   PONV Controlled:  None   Airway Patency:  Patent      Post Op Vitals Reviewed: Yes      Staff: Anesthesiologist, CRNA   Comments: report given to RN; VSS; RA        No notable events documented      BP   100/82   Temp      Pulse  80   Resp   14   SpO2   98

## 2023-03-08 ENCOUNTER — TELEMEDICINE (OUTPATIENT)
Dept: BEHAVIORAL/MENTAL HEALTH CLINIC | Facility: CLINIC | Age: 58
End: 2023-03-08

## 2023-03-08 DIAGNOSIS — F31.31 BIPOLAR AFFECTIVE DISORDER, CURRENTLY DEPRESSED, MILD (HCC): Primary | ICD-10-CM

## 2023-03-08 NOTE — PSYCH
Behavioral Health Psychotherapy Progress Note    Psychotherapy Provided: Individual Psychotherapy     1  Bipolar affective disorder, currently depressed, mild ([address] )            Goals addressed in session: Goal 1     DATA:   Reached client on amwell now  Client stated that she has been doing her taxes, and other paperwork  Keeping track of things - rent rebate, and lye heat  Client talked about getting two degrees so she has student loans  Had stroke with only two classes before she graduated  Feeling frustrated with her limitations  Was feeling anxious about completing the paperwork  Had colonoscopy last week  Everything went well  Still waiting for the results of mammogram  Also had bone density screen  Has stained glass supplies (learning it from a book) and jewelry supplies  Client stated that overall she is doing well  She continues to help friend with the paper counting  She has her car, but not using it much  She is trying to manage her finances very carefully  Provided empathy and support Client processed current events and stressors  Encouraged client to reflect on her situation and waht was in her control and what choices she has within each situation  Emphasized ho she has overcome so much so far and addressed the support she does have  During this session, this clinician used the following therapeutic modalities: Client-centered Therapy    Substance Abuse was not addressed during this session  If the client is diagnosed with a co-occurring substance use disorder, please indicate any changes in the frequency or amount of use: no change  Stage of change for addressing substance use diagnoses: No substance use/Not applicable    ASSESSMENT:  Stephane Toney presents with a Euthymic/ normal and Anxious mood  Going to try to get up "at a decent hour "     her affect is Normal range and intensity, which is congruent, with her mood and the content of the session   The client has made progress on their goals  Kinga Clarke presents with a minimal risk of suicide, minimal risk of self-harm, and minimal risk of harm to others  For any risk assessment that surpasses a "low" rating, a safety plan must be developed  A safety plan was indicated: no  If yes, describe in detail Reach out to family and friends for support and encouragement    PLAN: Between sessions, Kinga Clarke will use learned coping skills, continue to go outside every day for a few minutes, continue to reach out to friends daily  At the next session, the therapist will use Cognitive Behavioral Therapy to address depression, anxiety and managing moods  Behavioral Health Treatment Plan and Discharge Planning: Kinga Clarke is aware of and agrees to continue to work on their treatment plan  They have identified and are working toward their discharge goals  yes    Visit start and stop times:    03/14/23  Start Time: 1502  Stop Time: 1547  Total Visit Time: 45 minutes         Virtual Regular Visit    Verification of patient location:    Patient is located in the following state in which I hold an active license [place]      Assessment/Plan:    Problem List Items Addressed This Visit        Other    Bipolar affective disorder, currently depressed, mild ([facility] [address] ) - Primary       Goals addressed in session: Goal 1          Reason for visit is   Chief Complaint   Patient presents with   • Virtual Regular Visit        Encounter provider Chastity Rivas, PhD    Provider located at [address] [place] [address]  [phone]      Recent Visits  Date Type Provider Dept   03/08/23 Telemedicine Chastity Rivas, PhD Manoj Gauthier recent visits within past 7 days and meeting all other requirements  Future Appointments  No visits were found meeting these conditions    Showing future appointments within next 150 days and meeting all other requirements       The patient was identified by name and date of birth  Jennifer Ennis was informed that this is a telemedicine visit and that the visit is being conducted throughthe [employer] platform  She agrees to proceed     My office door was closed  No one else was in the room  She acknowledged consent and understanding of privacy and security of the video platform  The patient has agreed to participate and understands they can discontinue the visit at any time  Patient is aware this is a billable service  Subjective  Jennifer Ennis is a 62 y o  female who was friendly and cooperative   HPI     Past Medical History:   Diagnosis Date   • Anxiety    • Bipolar disorder ([facility] [address] )    • COPD (chronic obstructive pulmonary disease) ([facility] )    • Hyperlipidemia    • PTSD (post-traumatic stress disorder)    • Seizures (HCC)        Past Surgical History:   Procedure Laterality Date   • CEREBRAL ANEURYSM REPAIR      X2   • COLONOSCOPY         Current Outpatient Medications   Medication Sig Dispense Refill   • albuterol (PROVENTIL HFA,VENTOLIN HFA) 90 mcg/act inhaler INHALE 1 PUFF EVERY 4 TO 6 HOURS AS NEEDED     • atorvastatin (LIPITOR) 40 mg tablet Take 40 mg by mouth daily     • fluticasone (FLONASE) 50 mcg/act nasal spray SPRAY 1 SPRAY INTO EACH NOSTRIL EVERY DAY FOR 90 DAYS     • propranolol (INDERAL) 10 mg tablet Take 1 tablet (10 mg total) by mouth 2 (two) times a day 180 tablet 0   • QUEtiapine (SEROquel) 100 mg tablet Take 1 tablet (100 mg total) by mouth daily at bedtime 90 tablet 0   • QUEtiapine (SEROquel) 400 MG tablet Take 1 tablet (400 mg total) by mouth daily at bedtime 90 tablet 0     No current facility-administered medications for this visit  Allergies   Allergen Reactions   • Naproxen GI Intolerance   • Percocet [Oxycodone-Acetaminophen] GI Intolerance       Review of Systems    Video Exam    There were no vitals filed for this visit      Physical Exam     Visit Time    03/14/23  Start Time: 1502  Stop Time: 7206  Total Visit Time: 45 minutes

## 2023-03-27 ENCOUNTER — TELEMEDICINE (OUTPATIENT)
Dept: BEHAVIORAL/MENTAL HEALTH CLINIC | Facility: CLINIC | Age: 58
End: 2023-03-27

## 2023-03-27 DIAGNOSIS — F31.31 BIPOLAR AFFECTIVE DISORDER, CURRENTLY DEPRESSED, MILD (HCC): Primary | ICD-10-CM

## 2023-03-27 NOTE — PSYCH
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  Nieves Dominguez, a friend, was diagnosed with cancer and he   Another misael who was living in [facility], he  of cancer  Her kids are keeping the trailer  Woke up at 6 am - couldn't go back to sleep  Blood work - has red flags, high blood sugars  Lipid pannel  Wednesday to PCP  Went for a bone scan  Colonoscopy had pollups, Mammo - cysts  Going to make a dental visit  Gyno  Talked about history of bipolar which included depression and prashant  Worried about sleep cycle, was up late last night  Then woke up early this morning  Interested in volunteering - maybe a pet store, socializing with the [email or web address]  [address] [place] [address]  Therapist provided empathy and support  Listened attentively as client shared feelings and thoughts  Provided empathic reflection and validated client's feelings  Therapist led client in a grounding and centering exercise  Encouraged client to act from a place of grounding, making sure to make choices which support her  During this session, this clinician used the following therapeutic modalities: Cognitive Behavioral Therapy    Substance Abuse was not addressed during this session  If the client is diagnosed with a co-occurring substance use disorder, please indicate any changes in the frequency or amount of use: no change  Stage of change for addressing substance use diagnoses: No substance use/Not applicable    ASSESSMENT:  Bobbi Scales presents with a Euthymic/ normal mood    Client reported that she has been feeling depressed and "frustrated with all the paperwork she has had to complete lately  Mostly medical paperwork  I feel like I got to do something, because everything is the Same day in and day out        her affect is Normal range and intensity, which is congruent, with her mood and the content of the session  The client has made progress on their goals  Joseph Dawson presents with a minimal risk of suicide, minimal risk of self-harm, and minimal risk of harm to others  For any risk assessment that surpasses a \"low\" rating, a safety plan must be developed  A safety plan was indicated: no  If yes, describe in detail continue to reach out to friends and family for support    PLAN: Between sessions, Joseph Dawson will plan fun activities for the future, stay connected to friends and family*  At the next session, the therapist will use Cognitive Behavioral Therapy to address to address mood stabilization  Behavioral Health Treatment Plan and Discharge Planning: Joseph Dawson is aware of and agrees to continue to work on their treatment plan  They have identified and are working toward their discharge goals   yes    Visit start and stop times:    04/03/23  Start Time: 1601  Stop Time: 1644  Total Visit Time: 43 minutes    Virtual Regular Visit    Verification of patient location:    Patient is located in the following state in which I hold an active license [place]      Assessment/Plan:    Problem List Items Addressed This Visit        Other    Bipolar affective disorder, currently depressed, mild ([facility] [address] ) - Primary       Goals addressed in session: Goal 1          Reason for visit is   Chief Complaint   Patient presents with   • Virtual Regular Visit        Encounter provider Andre Bonilla, PhD    Provider located at [address] [place], [place] [phone]      Recent Visits  Date Type Provider Dept   03/27/23 Telemedicine " Constantine Enriquez, PhD Jackson Kamara recent visits within past 7 days and meeting all other requirements  Future Appointments  No visits were found meeting these conditions  Showing future appointments within next 150 days and meeting all other requirements       The patient was identified by name and date of birth  Emilie Vanegas was informed that this is a telemedicine visit and that the visit is being conducted throughthe [employer] platform  She agrees to proceed     My office door was closed  No one else was in the room  She acknowledged consent and understanding of privacy and security of the video platform  The patient has agreed to participate and understands they can discontinue the visit at any time  Patient is aware this is a billable service  Subjective  Emilie Vanegas is a 62 y o  female who was friendly and cooperative         HPI     Past Medical History:   Diagnosis Date   • Anxiety    • Bipolar disorder ([facility] [address] )    • COPD (chronic obstructive pulmonary disease) ([facility] [address] )    • Hyperlipidemia    • PTSD (post-traumatic stress disorder)    • Seizures (HCC)        Past Surgical History:   Procedure Laterality Date   • CEREBRAL ANEURYSM REPAIR      X2   • COLONOSCOPY         Current Outpatient Medications   Medication Sig Dispense Refill   • albuterol (PROVENTIL HFA,VENTOLIN HFA) 90 mcg/act inhaler INHALE 1 PUFF EVERY 4 TO 6 HOURS AS NEEDED     • atorvastatin (LIPITOR) 40 mg tablet Take 40 mg by mouth daily     • fluticasone (FLONASE) 50 mcg/act nasal spray SPRAY 1 SPRAY INTO EACH NOSTRIL EVERY DAY FOR 90 DAYS     • lamoTRIgine (LaMICtal) 25 mg tablet Take 1 tablet (25 mg total) by mouth daily 30 tablet 1   • propranolol (INDERAL) 10 mg tablet Take 1 tablet (10 mg total) by mouth 2 (two) times a day 180 tablet 0   • QUEtiapine (SEROquel) 400 MG tablet Take 1 tablet (400 mg total) by mouth daily at bedtime 90 tablet 0     No current facility-administered medications for this visit  Allergies   Allergen Reactions   • Naproxen GI Intolerance   • Percocet [Oxycodone-Acetaminophen] GI Intolerance       Review of Systems    Video Exam    There were no vitals filed for this visit      Physical Exam     Visit Time04/03/23  Start Time: 9675  Stop Time: 2558  Total Visit Time: 43 minutes

## 2023-03-28 ENCOUNTER — TELEMEDICINE (OUTPATIENT)
Dept: PSYCHIATRY | Facility: CLINIC | Age: 58
End: 2023-03-28

## 2023-03-28 DIAGNOSIS — F41.1 GENERALIZED ANXIETY DISORDER: ICD-10-CM

## 2023-03-28 DIAGNOSIS — F31.81 BIPOLAR II DISORDER (HCC): Primary | ICD-10-CM

## 2023-03-28 RX ORDER — QUETIAPINE FUMARATE 400 MG/1
400 TABLET, FILM COATED ORAL
Qty: 90 TABLET | Refills: 0 | Status: SHIPPED | OUTPATIENT
Start: 2023-03-28

## 2023-03-28 RX ORDER — LAMOTRIGINE 25 MG/1
25 TABLET ORAL DAILY
Qty: 30 TABLET | Refills: 1 | Status: SHIPPED | OUTPATIENT
Start: 2023-03-28

## 2023-03-28 RX ORDER — PROPRANOLOL HYDROCHLORIDE 10 MG/1
10 TABLET ORAL 2 TIMES DAILY
Qty: 180 TABLET | Refills: 0 | Status: SHIPPED | OUTPATIENT
Start: 2023-03-28

## 2023-03-28 NOTE — PATIENT INSTRUCTIONS
Pt would like to taper down seroquel 400 mg   Add lamictal 25 mg   F/u with PCP re: blood work results

## 2023-03-28 NOTE — PSYCH
Virtual Regular Visit    Verification of patient location:    Patient is located in the following state in which I hold an active license [place]      Assessment/Plan:    Problem List Items Addressed This Visit    None      Goals addressed in session: Goal 1          Reason for visit is   Chief Complaint   Patient presents with   • Medication Management        Encounter provider Juan Antonio Murray MD    Provider located at [address] [facility] [address]  [facility]  [address]  [phone]      Recent Visits  No visits were found meeting these conditions  Showing recent visits within past 7 days and meeting all other requirements  Today's Visits  Date Type Provider Dept   23 Telemedicine Juan Antonio Murray, [facility] today's visits and meeting all other requirements  Future Appointments  No visits were found meeting these conditions  Showing future appointments within next 150 days and meeting all other requirements       The patient was identified by name and date of birth  Bryan Simmons was informed that this is a telemedicine visit and that the visit is being conducted throughthe [employer]  She agrees to proceed     My office door was closed  No one else was in the room  She acknowledged consent and understanding of privacy and security of the video platform  The patient has agreed to participate and understands they can discontinue the visit at any time  Patient is aware this is a billable service       Subjective  Bryan Simmons is a 62 y o  female with bipolar do presents for regular f/u     compliant with meds  SE weight gain (?)  As per pt, she had blood work done by PCP - abnormal results - f/u tomorrow  Stress - several people  last month  Failed mari cabrera - SE TD     HPI   Mood - feeling more depressed for th elast 1-2 month - low energy, anhedonia, poor ADLs, hopeless, less active  Mild mood "swings  Anxiety - \" all the time\"; worries  Psychosis  - denies      Past Medical History:   Diagnosis Date   • Anxiety    • Bipolar disorder ([facility] )    • COPD (chronic obstructive pulmonary disease) ([facility] )    • Hyperlipidemia    • PTSD (post-traumatic stress disorder)    • Seizures ([facility] )        Past Surgical History:   Procedure Laterality Date   • CEREBRAL ANEURYSM REPAIR      X2   • COLONOSCOPY         Current Outpatient Medications   Medication Sig Dispense Refill   • albuterol (PROVENTIL HFA,VENTOLIN HFA) 90 mcg/act inhaler INHALE 1 PUFF EVERY 4 TO 6 HOURS AS NEEDED     • atorvastatin (LIPITOR) 40 mg tablet Take 40 mg by mouth daily     • fluticasone (FLONASE) 50 mcg/act nasal spray SPRAY 1 SPRAY INTO EACH NOSTRIL EVERY DAY FOR 90 DAYS     • propranolol (INDERAL) 10 mg tablet Take 1 tablet (10 mg total) by mouth 2 (two) times a day 180 tablet 0   • QUEtiapine (SEROquel) 100 mg tablet Take 1 tablet (100 mg total) by mouth daily at bedtime 90 tablet 0   • QUEtiapine (SEROquel) 400 MG tablet Take 1 tablet (400 mg total) by mouth daily at bedtime 90 tablet 0     No current facility-administered medications for this visit  Allergies   Allergen Reactions   • Naproxen GI Intolerance   • Percocet [Oxycodone-Acetaminophen] GI Intolerance       Review of Systems   Constitutional: Positive for activity change (less active) and appetite change (low appetite)  Psychiatric/Behavioral: Positive for dysphoric mood  Negative for sleep disturbance and suicidal ideas  The patient is nervous/anxious  Video Exam    There were no vitals filed for this visit  Physical Exam  Constitutional:       Appearance: Normal appearance  Neurological:      Mental Status: She is alert  Psychiatric:         Attention and Perception: Attention and perception normal  Auditory hallucinations: talkative, slightly pressured  Mood and Affect: Mood is depressed  Affect is blunt  Behavior: Behavior is cooperative         " Thought Content:  Thought content normal          Judgment: Judgment normal       Comments: Talkative, slightly pressured          Visit Time    Visit Start Time: 2 20 pm   Visit Stop Time: 2 30 pm   Total Visit Duration: 18 minutes

## 2023-04-19 ENCOUNTER — TELEMEDICINE (OUTPATIENT)
Dept: BEHAVIORAL/MENTAL HEALTH CLINIC | Facility: CLINIC | Age: 58
End: 2023-04-19

## 2023-04-19 DIAGNOSIS — F31.31 BIPOLAR AFFECTIVE DISORDER, CURRENTLY DEPRESSED, MILD (HCC): Primary | ICD-10-CM

## 2023-04-19 NOTE — PSYCH
"Behavioral Health Psychotherapy Progress Note    Psychotherapy Provided: Individual Psychotherapy     1  Bipolar affective disorder, currently depressed, mild ([address] )            Goals addressed in session: Goal 1     DATA:   Reached client on amwell now  Stated that she is planning to go walking this afternoon  Is planning to ask her MD about decreasing her Seroquel  And she is happy about not being as sedated; she is hoping to lose weight  Decreasing sugar drinks, limiting sweets, sugar substitute  Client shared about her daily struggles and how she is handling them  She reported that she had been feeling under the weather this past week, and had not done as much as normal   Overall, she reported that she was doing well and using her coping skills to manage daily stressors  She acknowledged feeling a little more depressed but this might be secondary to not feeling as well lately  therapist provided empathy and support  Encouraged the use of coping skills, encouraged client taking good care of her physical health  During this session, this clinician used the following therapeutic modalities: Client-centered Therapy    Substance Abuse was not addressed during this session  If the client is diagnosed with a co-occurring substance use disorder, please indicate any changes in the frequency or amount of use: no change  Stage of change for addressing substance use diagnoses: Maintenance    ASSESSMENT:  Perry Calvillo presents with a Euthymic/ normal mood  her affect is Normal range and intensity, which is congruent, with her mood and the content of the session  The client has made progress on their goals  Perry Calvillo presents with a low risk of suicide, low risk of self-harm, and low risk of harm to others  For any risk assessment that surpasses a \"low\" rating, a safety plan must be developed      A safety plan was indicated: no  If yes, describe in detail reach out to family and friends for empathy and " support    PLAN: Between sessions, Allyson Hernandez will continue to use coping skills, continue to add to her list of self affirmations, reach out to family and friends for support and connection  At the next session, the therapist will use Cognitive Behavioral Therapy to address issues of depression and mood stability  Behavioral Health Treatment Plan and Discharge Planning: Allyson Hernandez is aware of and agrees to continue to work on their treatment plan  They have identified and are working toward their discharge goals  yes    Visit start and stop times:    04/28/23  Start Time: 1507  Stop Time: 1552  Total Visit Time: 45 minutes         Virtual Regular Visit    Verification of patient location:    Patient is located at Home in the following state in which I hold an active license [place]      Assessment/Plan:    Problem List Items Addressed This Visit        Other    Bipolar affective disorder, currently depressed, mild ([facility] [address] ) - Primary       Goals addressed in session: Goal 1          Reason for visit is   Chief Complaint   Patient presents with   • Virtual Regular Visit        Encounter provider Nella Lind, PhD    Provider located at [address] [place] [address]  [phone]      Recent Visits  No visits were found meeting these conditions  Showing recent visits within past 7 days and meeting all other requirements  Future Appointments  No visits were found meeting these conditions  Showing future appointments within next 150 days and meeting all other requirements       The patient was identified by name and date of birth  Bryan Simmons was informed that this is a telemedicine visit and that the visit is being conducted throughthe [employer] platform  She agrees to proceed     My office door was closed  No one else was in the room    She acknowledged consent and understanding of privacy and security of the video platform  The patient has agreed to participate and understands they can discontinue the visit at any time  Patient is aware this is a billable service  Subjective  Dennise Vanessa is a 62 y o  female who was friendly and cooperative   HPI     Past Medical History:   Diagnosis Date   • Anxiety    • Bipolar disorder ([facility] [address] [address] )    • COPD (chronic obstructive [facility] disease) ([facility] )    • Hyperlipidemia    • PTSD (post-traumatic stress disorder)    • Seizures (HCC)        Past Surgical History:   Procedure Laterality Date   • CEREBRAL ANEURYSM REPAIR      X2   • COLONOSCOPY         Current Outpatient Medications   Medication Sig Dispense Refill   • albuterol (PROVENTIL HFA,VENTOLIN HFA) 90 mcg/act inhaler INHALE 1 PUFF EVERY 4 TO 6 HOURS AS NEEDED     • atorvastatin (LIPITOR) 40 mg tablet Take 40 mg by mouth daily     • fluticasone (FLONASE) 50 mcg/act nasal spray SPRAY 1 SPRAY INTO EACH NOSTRIL EVERY DAY FOR 90 DAYS     • lamoTRIgine (LaMICtal) 25 mg tablet Take 2 tablets (50 mg total) by mouth daily 180 tablet 0   • propranolol (INDERAL) 10 mg tablet Take 1 tablet (10 mg total) by mouth 2 (two) times a day 180 tablet 0   • QUEtiapine (SEROquel) 300 mg tablet Take 1 tablet (300 mg total) by mouth daily at bedtime 30 tablet 1     No current facility-administered medications for this visit  Allergies   Allergen Reactions   • Naproxen GI Intolerance   • Percocet [Oxycodone-Acetaminophen] GI Intolerance       Review of Systems    Video Exam    There were no vitals filed for this visit      Physical Exam     Visit Time  04/28/23  Start Time: 3608  Stop Time: 9950  Total Visit Time: 45 minutes

## 2023-05-03 ENCOUNTER — TELEMEDICINE (OUTPATIENT)
Dept: BEHAVIORAL/MENTAL HEALTH CLINIC | Facility: CLINIC | Age: 58
End: 2023-05-03

## 2023-05-03 DIAGNOSIS — F31.9 BIPOLAR AFFECTIVE DISORDER, REMISSION STATUS UNSPECIFIED (HCC): Primary | ICD-10-CM

## 2023-05-03 NOTE — PSYCH
"Behavioral Health Psychotherapy Progress Note    Psychotherapy Provided: Individual Psychotherapy     1  Bipolar affective disorder, remission status unspecified ([facility] )            Goals addressed in session: Goal 1     DATA:   Luis Dewey come over at 1:30;   Regine Gomez will come and help - I have to use a small bucket to empty the rain gutters  Talked about the flowers she has outside, small torie bush outside, orange and yellow  News break ap on my phone  Moods have been stable  Reported that she increased lomictal, and going to 300 of seroquel at night  Pavithra Perrin would like to get off of the seroquel because of the weight gain, and increased sedation  Finished her last meño art painting  Client reports less sedation in the day, but still has trouble sleeping at night  Enjoys doing her Travessa [address]ce [address], went to [address], [place] ip[place]ive [place]? Therapist listened attentively as client shared struggles and frustrations  Provided empathy and support  Encouraged client to continue to use her learned coping skills to manage stressors  Return in 1-2 weeks  During this session, this clinician used the following therapeutic modalities: Cognitive Behavioral Therapy    Substance Abuse was not addressed during this session  If the client is diagnosed with a co-occurring substance use disorder, please indicate any changes in the frequency or amount of use: mood stability  Stage of change for addressing substance use diagnoses: Maintenance    ASSESSMENT:  Naya Larsen presents with a Euthymic/ normal mood  Irritable       her affect is Normal range and intensity, which is congruent, with her mood and the content of the session  The client has made progress on their goals  Naya Larsen presents with a low risk of suicide, low risk of self-harm, and low risk of harm to others  For any risk assessment that surpasses a \"low\" rating, a safety plan must be developed      A safety plan was indicated: no  If yes, describe in detail " reach out to family and friends for support    PLAN: Between sessions, Ernesto Morales will continue to take care of her home, asking for help as needed  Use coping skills to manage anxiety when going out in public  [place] [facility] At the next session, the therapist will use Cognitive Behavioral Therapy to address anxiety and depression  Behavioral Health Treatment Plan and Discharge Planning: Ernesto Morales is aware of and agrees to continue to work on their treatment plan  They have identified and are working toward their discharge goals  yes    Visit start and stop times:    05/03/23  Start Time: 1404  Stop Time: 7179  Total Visit Time: 44 minutes    Virtual Regular Visit    Verification of patient location:    Patient is located at Home in the following state in which I hold an active license [place]      Assessment/Plan:    Problem List Items Addressed This Visit    None  Visit Diagnoses     Bipolar affective disorder, remission status unspecified ([facility] )    -  Primary          Goals addressed in session: Goal 1          Reason for visit is   Chief Complaint   Patient presents with    Virtual Regular Visit        Encounter provider Walter Christensen PhD    Provider located at [address] [place] [id number]  [phone]      Recent Visits  No visits were found meeting these conditions  Showing recent visits within past 7 days and meeting all other requirements  Today's Visits  Date Type Provider Dept   05/03/23 Telemedicine PhD Mathieu Torres today's visits and meeting all other requirements  Future Appointments  No visits were found meeting these conditions  Showing future appointments within next 150 days and meeting all other requirements       The patient was identified by name and date of birth   Mimi Rossi was informed that this is a telemedicine visit and that the visit is being conducted throughthe [employer] platform  She agrees to proceed     My office door was closed  No one else was in the room  She acknowledged consent and understanding of privacy and security of the video platform  The patient has agreed to participate and understands they can discontinue the visit at any time  Patient is aware this is a billable service  Subjective  Maris Kumar is a 62 y o  female who was friendly and cooperative   HPI     Past Medical History:   Diagnosis Date    Anxiety     [facility] )     [facility]     Hyperlipidemia     PTSD (post-traumatic stress disorder)     Seizures (HCC)        Past Surgical History:   Procedure Laterality Date    CEREBRAL ANEURYSM REPAIR      X2    COLONOSCOPY         Current Outpatient Medications   Medication Sig Dispense Refill    albuterol (PROVENTIL HFA,VENTOLIN HFA) 90 mcg/act inhaler INHALE 1 PUFF EVERY 4 TO 6 HOURS AS NEEDED      atorvastatin (LIPITOR) 40 mg tablet Take 40 mg by mouth daily      fluticasone (FLONASE) 50 mcg/act nasal spray SPRAY 1 SPRAY INTO EACH NOSTRIL EVERY DAY FOR 90 DAYS      lamoTRIgine (LaMICtal) 25 mg tablet Take 2 tablets (50 mg total) by mouth daily 180 tablet 0    propranolol (INDERAL) 10 mg tablet Take 1 tablet (10 mg total) by mouth 2 (two) times a day 180 tablet 0    QUEtiapine (SEROquel) 300 mg tablet Take 1 tablet (300 mg total) by mouth daily at bedtime 30 tablet 1     No current facility-administered medications for this visit  Allergies   Allergen Reactions    Naproxen GI Intolerance    Percocet [Oxycodone-Acetaminophen] GI Intolerance       Review of Systems    Video Exam    There were no vitals filed for this visit      Physical Exam     Visit Time

## 2023-05-17 ENCOUNTER — TELEMEDICINE (OUTPATIENT)
Dept: BEHAVIORAL/MENTAL HEALTH CLINIC | Facility: CLINIC | Age: 58
End: 2023-05-17

## 2023-05-17 DIAGNOSIS — F31.31 BIPOLAR AFFECTIVE DISORDER, CURRENTLY DEPRESSED, MILD (HCC): Primary | ICD-10-CM

## 2023-05-17 NOTE — PSYCH
Behavioral Health Psychotherapy Progress Note    Psychotherapy Provided: Individual Psychotherapy     1  Bipolar affective disorder, currently depressed, mild ([address] )            Goals addressed in session: Goal 1     DATA:   Reached client on amwell embedded  I am on lamictal - she just increased if from 10- 20 mg - not sure if there is any change yet  Wants off the seroquel because of weight gain and feeling groggy  Client reported that she is doing OK  Talked about walking daily with her friend's dog  Stated that she wants to get into better shape, and lose some weight  She also talked about planting some flowers in pots and the squirrels in the neighborhood came and destroyed her pottings  Overall, client reported that she is doing well, that the small stressors of life are not upsetting her too much, that she is able to manage her moods and responses, and while irritated, she also finds the humor in things  She spoke with antelmo about taking the dog for a walk, and feeling more adventuresome and courageous - less anxious and agoraphobic     therpist listened attentively, provided empathy and support, celebrating client's good progress and growing courage to venture out into the world with antelmo  Reviewed coping skills and explored supports she currently has in her life  Asked client to sign her crisis plan and treatment plans  Client went onto her my chart link but was unable to find the correct place to sign  [facility] but worker was unable to help her locate the document either  Will ask  to print and send plans for signature  Leellen Riedel, 1965, actively participated in the creation of this treatment plan during a virtual session, using the [address]  Leellen Riedel  provided verbal consent on 5/18/2023 at 2:32  PM  The treatment plan was transcribed into the [employer] Record at a later time     Client gave permission for her Crisis Plan on 4/5/23 and "for her updated treatment plan on 5/17/ 23  She tried to go online to sign it, but could not find out how to do that  Will check back next session    During this session, this clinician used the following therapeutic modalities: Cognitive Behavioral Therapy     Substance Abuse was addressed during this session  If the client is diagnosed with a co-occurring substance use disorder, please indicate any changes in the frequency or amount of use: no change  Stage of change for addressing substance use diagnoses: Contemplation  I quit for two years  After my mom was sick, I started smoking again  I am trying to cut back if I can  ASSESSMENT:  Marcell Session presents with a Euthymic/ normal and Dysthymic mood  her affect is Normal range and intensity, which is congruent, with her mood and the content of the session  The client has made progress on their goals  Marcell Session presents with a low risk of suicide, low risk of self-harm, and low risk of harm to others  For any risk assessment that surpasses a \"low\" rating, a safety plan must be developed  A safety plan was indicated: no  If yes, describe in detail reach out to friends and family for empathy and support    PLAN: Between sessions, Marcell Mello will try and reduce her cigarettes smoked per day  She will continue to go out of the house walking daily to decrease agoraphobia and anxiety  At the next session, the therapist will use Cognitive Behavioral Therapy to address anxiety and depression  Behavioral Health Treatment Plan and Discharge Planning: Marcell Mello is aware of and agrees to continue to work on their treatment plan  They have identified and are working toward their discharge goals   yes    Visit start and stop times:    05/17/23  Start Time: 1406  Stop Time: 1450  Total Visit Time: 44 minutes         Virtual Regular Visit    Verification of patient location:    Patient is located at Home in the following state in which I hold " an active license [place]      Assessment/Plan:    Problem List Items Addressed This Visit        Other    Bipolar affective disorder, currently depressed, mild ([facility] ) - Primary       Goals addressed in session: Goal 1          Reason for visit is   Chief Complaint   Patient presents with   • Virtual Regular Visit        Encounter provider Estiven Lopez, PhD    Provider located at [address] [address] [phone]      Recent Visits  Date Type Provider Dept   05/17/23 Telemedicine Estiven Lopez, PhD Mamta Adan recent visits within past 7 days and meeting all other requirements  Future Appointments  No visits were found meeting these conditions  Showing future appointments within next 150 days and meeting all other requirements       The patient was identified by name and date of birth  Judson Melgar was informed that this is a telemedicine visit and that [facility]  She agrees to proceed     My office door was closed  No one else was in the room  She acknowledged consent and understanding of privacy and security of the video platform  The patient has agreed to participate and understands they can discontinue the visit at any time  Patient is aware this is a billable service  Subjective  Judson Melgar is a 62 y o  female who was friendly and cooperative         HPI     Past Medical History:   Diagnosis Date   • Anxiety    • Bipolar disorder ([facility] )    • COPD (chronic obstructive pulmonary disease) ([facility] )    • Hyperlipidemia    • PTSD (post-traumatic stress disorder)    • Seizures ([facility] )        Past Surgical History:   Procedure Laterality Date   • CEREBRAL ANEURYSM REPAIR      X2   • COLONOSCOPY         Current Outpatient Medications   Medication Sig Dispense Refill   • albuterol (PROVENTIL HFA,VENTOLIN HFA) 90 mcg/act inhaler INHALE 1 PUFF EVERY 4 TO 6 HOURS AS NEEDED     • atorvastatin (LIPITOR) 40 mg tablet Take 40 mg by mouth daily     • fluticasone (FLONASE) 50 mcg/act nasal spray SPRAY 1 SPRAY INTO EACH NOSTRIL EVERY DAY FOR 90 DAYS     • lamoTRIgine (LaMICtal) 25 mg tablet Take 2 tablets (50 mg total) by mouth daily 180 tablet 0   • propranolol (INDERAL) 10 mg tablet Take 1 tablet (10 mg total) by mouth 2 (two) times a day 180 tablet 0   • QUEtiapine (SEROquel) 300 mg tablet Take 1 tablet (300 mg total) by mouth daily at bedtime 30 tablet 1     No current facility-administered medications for this visit  Allergies   Allergen Reactions   • Naproxen GI Intolerance   • Percocet [Oxycodone-Acetaminophen] GI Intolerance       Review of Systems    Video Exam    There were no vitals filed for this visit      Physical Exam     Visit Time  05/17/23  Start Time: 7275  Stop Time: 1450  Total Visit Time: 44 minutes

## 2023-05-17 NOTE — BH TREATMENT PLAN
Outpatient [address]  1965     Date of Initial Psychotherapy Assessment: 5/17/23   Date of Current Treatment Plan: 05/18/23  Treatment Plan Target Date: review in six month  Treatment Plan Expiration Date: review in six months    Diagnosis:   1  Bipolar affective disorder, currently depressed, mild ([facility] [address] )            Area(s) of Need: client had stroke and has some cognitive deficits    Long Term Goal 1 (in the client's own words):  I want to continue to walk daily, because that helps me manage my moods and keeps me feeling positive  Stage of Change: Preparation    Target Date for completion: Review in six months     Anticipated therapeutic modalities: Cognitive behavioral therapy, supportive therapy,      People identified to complete this goal: Sindy      Objective 1: (identify the means of measuring success in meeting the objective): I want to make a list of positive affirmations to help me manage my moods  Objective 2: (identify the means of measuring success in meeting the objective): I want to continue to reach out to friends and accept social engagements when I can, to help me manage my anxiety and help me manage my agoraphobia  I am currently under the care of a [facility] psychiatric provider: yes    My [facility] psychiatric provider is: Salvatore Michael am currently taking psychiatric medications: Yes, as prescribed    I feel that I will be ready for discharge from mental health care when I reach the following (measurable goal/objective): when I am emotionally stable    For children and adults who have a legal guardian:   Has there been any change to custody orders and/or guardianship status? NA  If yes, attach updated documentation      I have created my Crisis Plan and have been offered a copy of this plan    [address]: Diagnosis and Treatment Plan explained to [facility] acknowledges an understanding of their diagnosis  Marcell Mello agrees to this treatment plan      I have been offered a copy of this Treatment Plan  yes

## 2023-05-25 ENCOUNTER — TELEMEDICINE (OUTPATIENT)
Dept: PSYCHIATRY | Facility: CLINIC | Age: 58
End: 2023-05-25

## 2023-05-25 DIAGNOSIS — F31.81 BIPOLAR II DISORDER (HCC): Primary | ICD-10-CM

## 2023-05-25 DIAGNOSIS — F41.1 GENERALIZED ANXIETY DISORDER: ICD-10-CM

## 2023-05-25 RX ORDER — PROPRANOLOL HYDROCHLORIDE 10 MG/1
10 TABLET ORAL 2 TIMES DAILY
Qty: 180 TABLET | Refills: 0 | Status: SHIPPED | OUTPATIENT
Start: 2023-05-25

## 2023-05-25 RX ORDER — QUETIAPINE FUMARATE 200 MG/1
200 TABLET, FILM COATED ORAL
Qty: 30 TABLET | Refills: 1 | Status: SHIPPED | OUTPATIENT
Start: 2023-05-25

## 2023-05-25 NOTE — PSYCH
"  Virtual Regular Visit    Verification of patient location:    Patient is located at Home in the following state in which I hold an active license [place]      Assessment/Plan:    Problem List Items Addressed This Visit    None  Visit Diagnoses     Generalized anxiety disorder              Goals addressed in session: Goal 1          Reason for visit is   Chief Complaint   Patient presents with   • Medication Management        Encounter provider Pato Torres MD    Provider located at [address] [address]  [facility]  [address]  [phone]      Recent Visits  No visits were found meeting these conditions  Showing recent visits within past 7 days and meeting all other requirements  Today's Visits  Date Type Provider Dept   05/25/23 Telemedicine Pato Torres, [facility] today's visits and meeting all other requirements  Future Appointments  No visits were found meeting these conditions  Showing future appointments within next 150 days and meeting all other requirements       The patient was identified by name and date of birth  Javier Carrera was informed that this is a telemedicine visit and that the visit is being conducted throughthe [facility]  She agrees to proceed     My office door was closed  No one else was in the room  She acknowledged consent and understanding of privacy and security of the video platform  The patient has agreed to participate and understands they can discontinue the visit at any time  Patient is aware this is a billable service  Subjective  Javier Carrera is a 62 y o  female with bipolar do presents for regular f/u     On seroquel taper, SE sedation and elevated BGM  On diet and exercises, walks daily       HPI   Mood - mild mood swings, episodes of sadness, but \" more optimistic\", better energy  Anxiety - \" on and off\"; \" manageable\", being outside helps  Psychosis - " denies    Past Medical History:   Diagnosis Date   • Anxiety    • Bipolar disorder ([facility] [address] )    • COPD (chronic obstructive pulmonary disease) ([facility] [address] )    • Hyperlipidemia    • PTSD (post-traumatic stress disorder)    • Seizures (HCC)        Past Surgical History:   Procedure Laterality Date   • CEREBRAL ANEURYSM REPAIR      X2   • COLONOSCOPY         Current Outpatient Medications   Medication Sig Dispense Refill   • lamoTRIgine (LaMICtal) 25 mg tablet Take 2 tablets (50 mg total) by mouth daily 180 tablet 0   • propranolol (INDERAL) 10 mg tablet Take 1 tablet (10 mg total) by mouth 2 (two) times a day 180 tablet 0   • albuterol (PROVENTIL HFA,VENTOLIN HFA) 90 mcg/act inhaler INHALE 1 PUFF EVERY 4 TO 6 HOURS AS NEEDED     • atorvastatin (LIPITOR) 40 mg tablet Take 40 mg by mouth daily     • fluticasone (FLONASE) 50 mcg/act nasal spray SPRAY 1 SPRAY INTO EACH NOSTRIL EVERY DAY FOR 90 DAYS       No current facility-administered medications for this visit  Allergies   Allergen Reactions   • Naproxen GI Intolerance   • Percocet [Oxycodone-Acetaminophen] GI Intolerance       Review of Systems   Constitutional: Negative for activity change and appetite change  Psychiatric/Behavioral: Positive for dysphoric mood  Negative for sleep disturbance and suicidal ideas  The patient is nervous/anxious  Video Exam    There were no vitals filed for this visit  Physical Exam  Constitutional:       Appearance: Normal appearance  She is obese  Neurological:      Mental Status: She is alert  Psychiatric:         Attention and Perception: Attention and perception normal          Mood and Affect: Mood normal  Affect is blunt  Speech: Speech normal          Behavior: Behavior normal  Behavior is cooperative  Thought Content:  Thought content normal          Judgment: Judgment normal           Visit Time    Visit Start Time: 5 57 pm   Visit Stop Time: 6 05 pm   Total Visit Duration: 15 minutes

## 2023-06-28 ENCOUNTER — TELEMEDICINE (OUTPATIENT)
Dept: BEHAVIORAL/MENTAL HEALTH CLINIC | Facility: CLINIC | Age: 58
End: 2023-06-28
Payer: MEDICARE

## 2023-06-28 DIAGNOSIS — F31.31 BIPOLAR AFFECTIVE DISORDER, CURRENTLY DEPRESSED, MILD (HCC): Primary | ICD-10-CM
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
## 2023-07-19 ENCOUNTER — TELEMEDICINE (OUTPATIENT)
Dept: PSYCHIATRY | Facility: CLINIC | Age: 58
End: 2023-07-19
Payer: MEDICARE

## 2023-07-19 DIAGNOSIS — F41.1 GENERALIZED ANXIETY DISORDER: ICD-10-CM

## 2023-07-19 DIAGNOSIS — F31.81 BIPOLAR II DISORDER (HCC): Primary | ICD-10-CM

## 2023-07-19 PROCEDURE — 99214 OFFICE O/P EST MOD 30 MIN: CPT | Performed by: PSYCHIATRY & NEUROLOGY

## 2023-07-19 RX ORDER — LAMOTRIGINE 25 MG/1
50 TABLET ORAL DAILY
Qty: 180 TABLET | Refills: 0 | Status: SHIPPED | OUTPATIENT
Start: 2023-07-19

## 2023-07-19 RX ORDER — QUETIAPINE FUMARATE 100 MG/1
TABLET, FILM COATED ORAL
Qty: 60 TABLET | Refills: 1 | Status: SHIPPED | OUTPATIENT
Start: 2023-07-19

## 2023-07-19 NOTE — PSYCH
Virtual Regular Visit    Verification of patient location:    Patient is located at Home in the following state in which I hold an active license [place]      Assessment/Plan:    Problem List Items Addressed This Visit    None      Goals addressed in session: Goal 1          Reason for visit is   Chief Complaint   Patient presents with   • Medication Management        Encounter provider Cleve Servin MD    Provider located at [address] [place] [address],[address]  [address] [place] [address]  [phone]      Recent Visits  No visits were found meeting these conditions. Showing recent visits within past 7 days and meeting all other requirements  Today's Visits  Date Type Provider Dept   07/19/23 Telemedicine Cleve Servin, [address] today's visits and meeting all other requirements  Future Appointments  No visits were found meeting these conditions. Showing future appointments within next 150 days and meeting all other requirements       The patient was identified by name and date of birth. Lina Peña was informed that this is a telemedicine visit and that the visit is being conducted throughthe [employer]. She agrees to proceed. .  My office door was closed. No one else was in the room. She acknowledged consent and understanding of privacy and security of the video platform. The patient has agreed to participate and understands they can discontinue the visit at any time. Patient is aware this is a billable service. Subjective  Lina Peña is a 62 y.o. female with bipolar do and anxiety presents for regular f/u  .   On seroquel taper down; continued lamictal  Blood work and PCP f/u next week for BGM  Lost some weight  Problems with a neighbor friend; another friend is moving to [facility]   Mood - feeling stressed by " personal problems"; but mostly stable; denies depression or mood swings  Anxiety - worries " all the time"; chronic, baseline. Doesn't`t take propranolol     Past Medical History:   Diagnosis Date   • Anxiety    • Bipolar disorder ([address])    • COPD (chronic obstructive pulmonary disease) ([address])    • Hyperlipidemia    • PTSD (post-traumatic stress disorder)    • Seizures (HCC)        Past Surgical History:   Procedure Laterality Date   • CEREBRAL ANEURYSM REPAIR      X2   • COLONOSCOPY         Current Outpatient Medications   Medication Sig Dispense Refill   • lamoTRIgine (LaMICtal) 25 mg tablet Take 2 tablets (50 mg total) by mouth daily 180 tablet 0   • propranolol (INDERAL) 10 mg tablet Take 1 tablet (10 mg total) by mouth 2 (two) times a day 180 tablet 0   • QUEtiapine (SEROquel) 200 mg tablet Take 1 tablet (200 mg total) by mouth daily at bedtime 30 tablet 1   • albuterol (PROVENTIL HFA,VENTOLIN HFA) 90 mcg/act inhaler INHALE 1 PUFF EVERY 4 TO 6 HOURS AS NEEDED     • atorvastatin (LIPITOR) 40 mg tablet Take 40 mg by mouth daily     • fluticasone (FLONASE) 50 mcg/act nasal spray SPRAY 1 SPRAY INTO EACH NOSTRIL EVERY DAY FOR 90 DAYS       No current facility-administered medications for this visit. Allergies   Allergen Reactions   • Naproxen GI Intolerance   • Percocet [Oxycodone-Acetaminophen] GI Intolerance       Review of Systems   Constitutional: Negative for activity change and appetite change. Psychiatric/Behavioral: Negative for dysphoric mood, sleep disturbance and suicidal ideas. The patient is not nervous/anxious. Video Exam    There were no vitals filed for this visit. Physical Exam  Constitutional:       Appearance: Normal appearance. She is obese. Neurological:      Mental Status: She is alert. Psychiatric:         Attention and Perception: Attention and perception normal.         Mood and Affect: Mood is anxious. Affect is blunt. Speech: Speech normal.         Behavior: Behavior normal. Behavior is cooperative. Thought Content:  Thought content normal. Judgment: Judgment normal.          Visit Time    Visit Start Time: 3.57 pm   Visit Stop Time: 4.09 pm   Total Visit Duration: 18 minutes

## 2023-08-02 ENCOUNTER — TELEMEDICINE (OUTPATIENT)
Dept: BEHAVIORAL/MENTAL HEALTH CLINIC | Facility: CLINIC | Age: 58
End: 2023-08-02
Payer: MEDICARE

## 2023-08-02 DIAGNOSIS — F31.31 BIPOLAR AFFECTIVE DISORDER, CURRENTLY DEPRESSED, MILD (HCC): Primary | ICD-10-CM

## 2023-08-02 PROCEDURE — 90834 PSYTX W PT 45 MINUTES: CPT | Performed by: PSYCHOLOGIST

## 2023-08-02 NOTE — PSYCH
Behavioral Health Psychotherapy Progress Note    Psychotherapy Provided: Individual Psychotherapy     No diagnosis found. Goals addressed in session: Goal 1     DATA:   Reached client on amwell embedded  Going to the casino with Luis Bernal because she gets comp rooms. Will do the beach and boardwalk. Looking for a swim suit  [employer] rent by $20 per month in September. Rent going up, but they are doing less and less services. Pricila King is not moving in here, he is moving with his ex to [place]. "but they are not a couple."  bc it is less expensive to live in [place]. "Kentrell May won!" feels like to me. 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 Has had car since February. Rent rebate goes to the [employer]. Paid for [employer]. Still helping her friend with the Newspapers. Lost 148-141,  Happy with 7 pound weight loss, but not motivated to push herself to lose more. Therapist provided empathy and support. Listened attentively as client sequenced through challenges and examples of handling things well. Celebrated victories and processed jessa where client was triggered or stuck. During this session, this clinician used the following therapeutic modalities: Client-centered Therapy    Substance Abuse was not addressed during this session. If the client is diagnosed with a co-occurring substance use disorder, please indicate any changes in the frequency or amount of use: no change. Stage of change for addressing substance use diagnoses: Contemplation    ASSESSMENT:  Vita Miranda presents with a Euthymic/ normal and Angry mood. At her male friend who is moving to [place] without her.      her affect is Normal range and intensity, which is congruent, with her mood and the content of the session. The client has made progress on their goals. Vita Miranda presents with a low risk of suicide, low risk of self-harm, and low risk of harm to others.     For any risk assessment that surpasses a "low" rating, a safety plan must be developed. A safety plan was indicated: no  If yes, describe in detail reach out to family and friends for empathy and support    PLAN: Between sessions, Nilo Wagoner will continue to go outside of her home, continue to engage with friends and others. At the next session, the therapist will use Cognitive Processing Therapy to address anxiety, isolation, emotional overwhelm. Behavioral Health Treatment Plan and Discharge Planning: Nilo Wagoner is aware of and agrees to continue to work on their treatment plan. They have identified and are working toward their discharge goals. yes    Visit start and stop times:    08/03/23  Start Time: 1605  Stop Time: 7616  Total Visit Time: 49 minutes           Virtual Regular Visit    Verification of patient location:    Patient is located at Home in the following state in which I hold an active license [place]      Assessment/Plan:    Problem List Items Addressed This Visit    None      Goals addressed in session: Goal 1          Reason for visit is   Chief Complaint   Patient presents with   • Virtual Regular Visit        Encounter provider Anastasiya Foster, PhD    Provider located at [address] [place] [address]  [phone]      Recent Visits  Date Type Provider Dept   08/02/23 Telemedicine Anastasiya Foster, PhD [facility] recent visits within past 7 days and meeting all other requirements  Future Appointments  No visits were found meeting these conditions. Showing future appointments within next 150 days and meeting all other requirements       The patient was identified by name and date of birth. Kelly Malin was informed that this is a telemedicine visit and [facility] She agrees to proceed. .  My office door was closed. No one else was in the room. She acknowledged consent and understanding of privacy and security of the video platform. The patient has agreed to participate and understands they can discontinue the visit at any time. Patient is aware this is a billable service. Katie Nunn is a 62 y.o. female who was friendly and cooperative . HPI     Past Medical History:   Diagnosis Date   • Anxiety    • Bipolar disorder ([address])    • COPD (chronic obstructive pulmonary disease) ([address])    • Hyperlipidemia    • PTSD (post-traumatic stress disorder)    • Seizures (HCC)        Past Surgical History:   Procedure Laterality Date   • CEREBRAL ANEURYSM REPAIR      X2   • COLONOSCOPY         Current Outpatient Medications   Medication Sig Dispense Refill   • albuterol (PROVENTIL HFA,VENTOLIN HFA) 90 mcg/act inhaler INHALE 1 PUFF EVERY 4 TO 6 HOURS AS NEEDED     • atorvastatin (LIPITOR) 40 mg tablet Take 40 mg by mouth daily     • fluticasone (FLONASE) 50 mcg/act nasal spray SPRAY 1 SPRAY INTO EACH NOSTRIL EVERY DAY FOR 90 DAYS     • lamoTRIgine (LaMICtal) 25 mg tablet Take 2 tablets (50 mg total) by mouth daily 180 tablet 0   • QUEtiapine (SEROquel) 100 mg tablet Take 1-2 tab at night 60 tablet 1     No current facility-administered medications for this visit. Allergies   Allergen Reactions   • Naproxen GI Intolerance   • Percocet [Oxycodone-Acetaminophen] GI Intolerance       Review of Systems    Video Exam    There were no vitals filed for this visit.     Physical Exam     Visit Time    V08/03/23  Start Time: 5288  Stop Time: 4509  Total Visit Time: 49 minutes

## 2023-08-30 ENCOUNTER — TELEMEDICINE (OUTPATIENT)
Dept: BEHAVIORAL/MENTAL HEALTH CLINIC | Facility: CLINIC | Age: 58
End: 2023-08-30
Payer: MEDICARE

## 2023-08-30 DIAGNOSIS — F41.1 GENERALIZED ANXIETY DISORDER: ICD-10-CM

## 2023-08-30 DIAGNOSIS — F31.81: Primary | ICD-10-CM

## 2023-08-30 PROCEDURE — 90834 PSYTX W PT 45 MINUTES: CPT | Performed by: PSYCHOLOGIST

## 2023-08-30 NOTE — PSYCH
Behavioral Health Psychotherapy Progress Note    Psychotherapy Provided: Individual Psychotherapy     1. Mild bipolar II disorder (HCC)        2. Generalized anxiety disorder            Goals addressed in session: Goal 1       DATA:   Reached client on amwell embedded  [address] - on [address] passes. But the cafeteria was closed on M, T, W.    Thumb is out of joint, hurts. Thinking about going to [facility]). Friend has been on oxycodone for awhile- pharmacist is now lecturing patients. I used to be a . I never knew if a customer had been drinking somewhere else, or popping something. 100 mg of seroquel - then I am stopping. Therapist listened attentively as client sequenced through challenges and stressors. Explored together things that cause her stress and how she can manage her stress better. Reviewed coping skills and encouraged the use of positive affirmations     During this session, this clinician used the following therapeutic modalities: Cognitive Behavioral Therapy    Substance Abuse was not addressed during this session. If the client is diagnosed with a co-occurring substance use disorder, please indicate any changes in the frequency or amount of use:  . Stage of change for addressing substance use diagnoses: Contemplation    ASSESSMENT:  Kimberly Miller presents with a Euthymic/ normal and Angry mood. her affect is Normal range and intensity, which is congruent, with her mood and the content of the session. The client has made progress on their goals. Kimberly Miller presents with a minimal risk of suicide, minimal risk of self-harm, and minimal risk of harm to others. For any risk assessment that surpasses a "low" rating, a safety plan must be developed. A safety plan was indicated: no  If yes, describe in detail NA    PLAN: Between sessions, Kimberly Miller will continue to use learned coping skills.  At the next session, the therapist will use Client-centered Therapy to address emotional stability. Behavioral Health Treatment Plan and Discharge Planning: Dominick Alcala is aware of and agrees to continue to work on their treatment plan. They have identified and are working toward their discharge goals. yes    Visit start and stop times:    08/30/23  Start Time: 7582  Stop Time: 3285  Total Visit Time: 45 minutes             Virtual Regular Visit    Verification of patient location:    Patient is located at Home in the following state in which I hold an active license [place]      Assessment/Plan:    Problem List Items Addressed This Visit    None  Visit Diagnoses     Mild bipolar II disorder ([address])    -  Primary    Generalized anxiety disorder              Goals addressed in session: Goal 1          Reason for visit is   Chief Complaint   Patient presents with   • Virtual Regular Visit   • Virtual Regular Visit        Encounter provider Obi Jameson, PhD    Provider located at [address] [place] [address] [phone]      Recent Visits  Date Type Provider Dept   08/30/23 Telemedicine Obi Jameson, PhD Aaron Eleazar recent visits within past 7 days and meeting all other requirements  Future Appointments  No visits were found meeting these conditions. Showing future appointments within next 150 days and meeting all other requirements       The patient was identified by name and date of birth. Adenike Francis was informed that this is a telemedicine visit and [facility] She agrees to proceed. .  My office door was closed. No one else was in the room. She acknowledged consent and understanding of privacy and security of the video platform. The patient has agreed to participate and understands they can discontinue the visit at any time.     Patient is aware this is a billable service. Subjective  Stevan Dominguez is a 62 y.o. female who was talkative, friendly and cooperative . HPI     Past Medical History:   Diagnosis Date   • Anxiety    • Bipolar disorder ([address])    • COPD (chronic obstructive pulmonary disease) ([address])    • Hyperlipidemia    • PTSD (post-traumatic stress disorder)    • Seizures (HCC)        Past Surgical History:   Procedure Laterality Date   • CEREBRAL ANEURYSM REPAIR      X2   • COLONOSCOPY         Current Outpatient Medications   Medication Sig Dispense Refill   • albuterol (PROVENTIL HFA,VENTOLIN HFA) 90 mcg/act inhaler INHALE 1 PUFF EVERY 4 TO 6 HOURS AS NEEDED     • atorvastatin (LIPITOR) 40 mg tablet Take 40 mg by mouth daily     • fluticasone (FLONASE) 50 mcg/act nasal spray SPRAY 1 SPRAY INTO EACH NOSTRIL EVERY DAY FOR 90 DAYS     • lamoTRIgine (LaMICtal) 25 mg tablet Take 2 tablets (50 mg total) by mouth daily 180 tablet 0   • QUEtiapine (SEROquel) 100 mg tablet Take 1-2 tab at night 60 tablet 1     No current facility-administered medications for this visit. Allergies   Allergen Reactions   • Naproxen GI Intolerance   • Percocet [Oxycodone-Acetaminophen] GI Intolerance       Review of Systems    Video Exam    There were no vitals filed for this visit.     Physical Exam     Visit Time  09/04/23  Start Time: 3440  Stop Time: 1749  Total Visit Time: 45 minutes      Virtual Regular Visit    Verification of patient location:    Patient is located at Home in the following state in which I hold an [place] license [place]      Assessment/Plan:    Problem List Items Addressed This Visit    None  Visit Diagnoses     Mild bipolar II disorder ([address])    -  Primary    Generalized anxiety disorder              Goals addressed in session: Goal 1          Reason for visit is   Chief Complaint   Patient presents with   • Virtual Regular Visit   • Virtual Regular Visit        Encounter provider Noe Meza, PhD    Provider located at [facility] [address] [facility] OUTPATIENT  [facility]  [address] [facility] [address] [phone]      Recent Visits  Date Type Provider Dept   08/30/23 Telemedicine Moi Lovell, PhD Uriel Zhou recent visits within past 7 days and meeting all other requirements  Future Appointments  No visits were found meeting these conditions. Showing future appointments within next 150 days and meeting all other requirements       The patient was identified by name and date of birth. Alexei Lau was informed that this is a telemedicine visit and [facility] She agrees to proceed. .  My office door was closed. No one else was in the room. She acknowledged consent and understanding of privacy and security of the video platform. The patient has agreed to participate and understands they can discontinue the visit at any time. Patient is aware this is a billable service. Subjective  Alexei Lau is a 62 y.o. female who was friendly and cooperataive .       HPI     Past Medical History:   Diagnosis Date   • Anxiety    • Bipolar disorder ([facility])    • COPD (chronic obstructive pulmonary disease) ([facility])    • Hyperlipidemia    • PTSD (post-traumatic stress disorder)    • Seizures (HCC)        Past Surgical History:   Procedure Laterality Date   • CEREBRAL ANEURYSM REPAIR      X2   • COLONOSCOPY         Current Outpatient Medications   Medication Sig Dispense Refill   • albuterol (PROVENTIL HFA,VENTOLIN HFA) 90 mcg/act inhaler INHALE 1 PUFF EVERY 4 TO 6 HOURS AS NEEDED     • atorvastatin (LIPITOR) 40 mg tablet Take 40 mg by mouth daily     • fluticasone (FLONASE) 50 mcg/act nasal spray SPRAY 1 SPRAY INTO EACH NOSTRIL EVERY DAY FOR 90 DAYS     • lamoTRIgine (LaMICtal) 25 mg tablet Take 2 tablets (50 mg total) by mouth daily 180 tablet 0   • QUEtiapine (SEROquel) 100 mg tablet Take 1-2 tab at night 60 tablet 1     No current facility-administered medications for this visit. Allergies   Allergen Reactions   • Naproxen GI Intolerance   • Percocet [Oxycodone-Acetaminophen] GI Intolerance       Review of Systems    Video Exam    There were no vitals filed for this visit.     Physical Exam     Visit Time    09/04/23  Start Time: 6922  Stop Time: 0181  Total Visit Time: 45 minutes

## 2023-09-18 ENCOUNTER — TELEMEDICINE (OUTPATIENT)
Dept: BEHAVIORAL/MENTAL HEALTH CLINIC | Facility: CLINIC | Age: 58
End: 2023-09-18
Payer: MEDICARE

## 2023-09-18 DIAGNOSIS — F31.81 BIPOLAR II DISORDER (HCC): Primary | ICD-10-CM

## 2023-09-18 PROCEDURE — 90834 PSYTX W PT 45 MINUTES: CPT | Performed by: PSYCHOLOGIST

## 2023-09-18 NOTE — BH TREATMENT PLAN
Outpatient Behavioral Health Psychotherapy Treatment Plan     Kimberly Miller  1965      Date of Initial Psychotherapy Assessment: 5/17/23   Date of Current Treatment Plan: 09/18/23  Treatment Plan Target Date: review in six month  Treatment Plan Expiration Date: review in six months     Diagnosis:   1. Bipolar affective disorder, currently depressed, mild ([facility])                Area(s) of Need: client had stroke and has some cognitive deficits, Anxiety makes it difficult for her to go out of the house, Sometimes.      Long Term Goal 1 (in the client's own words):  I want to continue to walk daily, because that helps me manage my moods and keeps me feeling positive. (continued 9/18/23)  I want to look for a place where I would feel comfortable volunteering.      Stage of Change: Preparation     Target Date for completion: Review in six months             Anticipated therapeutic modalities: Cognitive behavioral therapy, supportive therapy,              People identified to complete this goal: Sindy                    Objective 1: (identify the means of measuring success in meeting the objective): I want to continue to engage in work/volunteer/social activities. I will check out the [employer] and see if there is a cooking opportunity there for me. Objective 2: (identify the means of measuring success in meeting the objective): I want to continue to reach out to friends and accept social engagements when I can, to help me manage my anxiety and help me manage my agoraphobia.  (continued 9/18/23)       I am currently under the care of a [facility] psychiatric provider: yes     My [facility] psychiatric provider is: Ganesh Escobedo     I am currently taking psychiatric medications: Yes, as prescribed     I feel that I will be ready for discharge from mental health care when I reach the following (measurable goal/objective): when I am emotionally stable     For children and adults who have a legal guardian:          Has there been any change to custody orders and/or guardianship status? NA. If yes, attach updated documentation.     I have created my Crisis Plan and have been offered a copy of this plan     [address]: Diagnosis and Treatment Plan explained to [facility] acknowledges  an understanding of their diagnosis.  Pham Tovar agrees to this treatment plan.     I have been offered a copy of this Treatment Plan. yes

## 2023-09-18 NOTE — PSYCH
Behavioral Health Psychotherapy Progress Note    Psychotherapy Provided: Individual Psychotherapy     No diagnosis found. Goals addressed in session: Goal 1     DATA:   Reached client on amwell embedded  Completed [employer] paperwork. And she is hoping that everything needed was submitted. Frustrated that she had less than a week to complete the needed paperwork. Had trouble sleeping last night. 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 Her PS had suggested that this might a way that she can use her cooking school knowledge. Client is interested in this, but her wrist and thumb still hurt, so she is waiting for that to heal.  Keeping brace on thumb, and it seems to be healing. Reported she is taking care of dog from Thursday - Sunday night. Watching 90 day Fiancee. [employer] - 90 day fiance - [place] go [place]. Car - got in Feb - 4th fill. I want to manage my depression this winter since I have SAD. 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 That helps me feel productive. Writer provided trauma informed responses, explored emotions and processed and provided opportunity to reflect on decisions. Writer utilized an eclectic therapeutic approach including cognitive behavioral interventions, interpersonal therapy and person-centered       During this session, this clinician used the following therapeutic modalities: Cognitive Behavioral Therapy    Substance Abuse was not addressed during this session. If the client is diagnosed with a co-occurring substance use disorder, please indicate any changes in the frequency or amount of use:  . Stage of change for addressing substance use diagnoses: No substance use/Not applicable    ASSESSMENT:  Michael Royal presents with a Euthymic/ normal and Dysthymic mood. her affect is Normal range and intensity, which is congruent, with her mood and the content of the session.  The client has made progress on their goals. Pricila No presents with a low risk of suicide, minimal risk of self-harm, and minimal risk of harm to others. For any risk assessment that surpasses a "low" rating, a safety plan must be developed. A safety plan was indicated: no  If yes, describe in detail  Reach out to friends and family as needed. PLAN: Between sessions, Pricila No will continue to find new coping skills that do not require fine motor dexterity. At the next session, the therapist will use Cognitive Behavioral Therapy to address mood stability and effective coping. Behavioral Health Treatment Plan and Discharge Planning: Pricila No is aware of and agrees to continue to work on their treatment plan. They have identified and are working toward their discharge goals. yes    Visit start and stop times:    09/18/23  Start Time: 1402  Stop Time: 1450  Total Visit Time: 48 minutes      Virtual Regular Visit    Verification of patient location:    Patient is located at Home in the following state in which I hold an active license [place]      Assessment/Plan:    Problem List Items Addressed This Visit    None      Goals addressed in session: Goal 1          Reason for visit is   Chief Complaint   Patient presents with   • Virtual Regular Visit        Encounter provider Xander Young PhD    Provider located at [address] [place], [place] [id number]      Recent Visits  No visits were found meeting these conditions. Showing recent visits within past 7 days and meeting all other requirements  Today's Visits  Date Type Provider Dept   09/18/23 Telemedicine Xander Young, PhD Dany Ruiz today's visits and meeting all other requirements  Future Appointments  No visits were found meeting these conditions.   Showing future appointments within next 150 days and meeting all other requirements       The patient was identified by name and date of birth. Alexei Lau was informed that this is a telemedicine visit and [facility] She agrees to proceed. .  My office door was closed. No one else was in the room. She acknowledged consent and understanding of privacy and security of the video platform. The patient has agreed to participate and understands they can discontinue the visit at any time. Patient is aware this is a billable service. Subjective  Alexei Lau is a 62 y.o. female who was friendly and cooperative . HPI     Past Medical History:   Diagnosis Date   • Anxiety    • Bipolar disorder ([address])    • COPD (chronic obstructive pulmonary disease) ([address])    • Hyperlipidemia    • PTSD (post-traumatic stress disorder)    • Seizures (HCC)        Past Surgical History:   Procedure Laterality Date   • CEREBRAL ANEURYSM REPAIR      X2   • COLONOSCOPY         Current Outpatient Medications   Medication Sig Dispense Refill   • albuterol (PROVENTIL HFA,VENTOLIN HFA) 90 mcg/act inhaler INHALE 1 PUFF EVERY 4 TO 6 HOURS AS NEEDED     • atorvastatin (LIPITOR) 40 mg tablet Take 40 mg by mouth daily     • fluticasone (FLONASE) 50 mcg/act nasal spray SPRAY 1 SPRAY INTO EACH NOSTRIL EVERY DAY FOR 90 DAYS     • lamoTRIgine (LaMICtal) 25 mg tablet Take 2 tablets (50 mg total) by mouth daily 180 tablet 0   • QUEtiapine (SEROquel) 100 mg tablet Take 1-2 tab at night 60 tablet 1     No current facility-administered medications for this visit. Allergies   Allergen Reactions   • Naproxen GI Intolerance   • Percocet [Oxycodone-Acetaminophen] GI Intolerance       Review of Systems    Video Exam    There were no vitals filed for this visit.     Physical Exam     Visit Time

## 2023-10-10 ENCOUNTER — TELEMEDICINE (OUTPATIENT)
Dept: BEHAVIORAL/MENTAL HEALTH CLINIC | Facility: CLINIC | Age: 58
End: 2023-10-10
Payer: MEDICARE

## 2023-10-10 DIAGNOSIS — F31.81 BIPOLAR II DISORDER (HCC): Primary | ICD-10-CM

## 2023-10-10 DIAGNOSIS — F41.1 GENERALIZED ANXIETY DISORDER: ICD-10-CM

## 2023-10-10 PROCEDURE — 90834 PSYTX W PT 45 MINUTES: CPT | Performed by: PSYCHOLOGIST

## 2023-10-10 NOTE — PSYCH
Behavioral Health Psychotherapy Progress Note    Psychotherapy Provided: Individual Psychotherapy     No diagnosis found. Goals addressed in session: Goal 1     DATA:   Reached client on amwell embedded  I have to bring my SSD paperwork. Disability Update Report. Employment? Attended any school or work training programs. Stroke and anurysm. Currently receiving services from therapy, Peer Support, and psychiatry. Having bad dreams  New PCP - I read the paperwork and it said, Kidney Disease stage 2 mild, Liver enzymes were low. Clident shared that she wished that her PCP had explained that to her -  instead of having her discover it on the paperwork. Maria Isabel Ruby expressed mixed feelings about her new PCP and is frustrated with her health and how difficult her life has been lately because of the stroke. 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 She is not feeling like she could do a job, but now that she has a car, might begin to consider that. Maria Isabel Ruby reports that her anxiety is so high, it is quite difficult for her to go out into public. Therapist listened attentively as client shared that joys and challenges of her life. Helped client sequence through daily events and gain insight into precursors for difficult emotions. Reviewed coping skills and encouraged client to continue to use positive self care skills to help maintain physical and emotional health and vitality. Provided empathy and support and encouraged client to continue to reach out to friends and supports. During this session, this clinician used the following therapeutic modalities: Cognitive Behavioral Therapy    Substance Abuse was not addressed during this session. If the client is diagnosed with a co-occurring substance use disorder, please indicate any changes in the frequency or amount of use:  .  Stage of change for addressing substance use diagnoses: Pre-contemplation    ASSESSMENT:  Eda Gomez presents with a Euthymic/ normal and Anxious mood. her affect is Normal range and intensity, which is congruent, with her mood and the content of the session. The client has made progress on their goals. Eda Gomez presents with a low risk of suicide, low risk of self-harm, and low risk of harm to others. For any risk assessment that surpasses a "low" rating, a safety plan must be developed. A safety plan was indicated: no  If yes, describe in detail reach out to family and friends    PLAN: Between sessions, Eda Gomez will continue to use learned coping skills to manage emotions. . At the next session, the therapist will use Cognitive Behavioral Therapy to address anxiety and emotional overwhelm. Behavioral Health Treatment Plan and Discharge Planning: Eda Gomez is aware of and agrees to continue to work on their treatment plan. They have identified and are working toward their discharge goals. yes    Visit start and stop times:    10/10/23       Virtual Regular Visit    Verification of patient location:    Patient is located at Home in the following state in which I hold an active license [place]      Assessment/Plan:    Problem List Items Addressed This Visit    None      Goals addressed in session: Goal 1          Reason for visit is   Chief Complaint   Patient presents with    Virtual Regular Visit        Encounter provider Noe Meza, PhD    Provider located at [address] [place] [address]  [phone]      Recent Visits  No visits were found meeting these conditions. Showing recent visits within past 7 days and meeting all other requirements  Future Appointments  No visits were found meeting these conditions.   Showing future appointments within next 150 days and meeting all other requirements       The patient was identified by name and date of birth. Akira rCane was informed that this is a telemedicine visit and [facility] She agrees to proceed. .  My office door was closed. No one else was in the room. She acknowledged consent and understanding of privacy and security of the video platform. The patient has agreed to participate and understands they can discontinue the visit at any time. Patient is aware this is a billable service. Subjective  Akira Crane is a 62 y.o. female who was anxious, frustrated, and frantic . HPI     Past Medical History:   Diagnosis Date    Anxiety     Bipolar disorder ([address])     COPD (chronic obstructive pulmonary disease) ([facility])     Hyperlipidemia     PTSD (post-traumatic stress disorder)     Seizures ([facility])        Past Surgical History:   Procedure Laterality Date    CEREBRAL ANEURYSM REPAIR      X2    COLONOSCOPY         Current Outpatient Medications   Medication Sig Dispense Refill    albuterol (PROVENTIL HFA,VENTOLIN HFA) 90 mcg/act inhaler INHALE 1 PUFF EVERY 4 TO 6 HOURS AS NEEDED      atorvastatin (LIPITOR) 40 mg tablet Take 40 mg by mouth daily      fluticasone (FLONASE) 50 mcg/act nasal spray SPRAY 1 SPRAY INTO EACH NOSTRIL EVERY DAY FOR 90 DAYS      lamoTRIgine (LaMICtal) 25 mg tablet Take 2 tablets (50 mg total) by mouth daily 180 tablet 0    QUEtiapine (SEROquel) 100 mg tablet Take 1-2 tab at night 60 tablet 1     No current facility-administered medications for this visit. Allergies   Allergen Reactions    Naproxen GI Intolerance    Percocet [Oxycodone-Acetaminophen] GI Intolerance       Review of Systems    Video Exam    There were no vitals filed for this visit.     Physical Exam     Visit Time  10/16/23  Start Time: 4837  Stop Time: 1665  Total Visit Time: 49 minutes

## 2023-10-11 ENCOUNTER — TELEMEDICINE (OUTPATIENT)
Dept: PSYCHIATRY | Facility: CLINIC | Age: 58
End: 2023-10-11
Payer: MEDICARE

## 2023-10-11 DIAGNOSIS — F31.81 BIPOLAR II DISORDER (HCC): ICD-10-CM

## 2023-10-11 DIAGNOSIS — F41.1 GENERALIZED ANXIETY DISORDER: Primary | ICD-10-CM

## 2023-10-11 PROCEDURE — 99214 OFFICE O/P EST MOD 30 MIN: CPT | Performed by: PSYCHIATRY & NEUROLOGY

## 2023-10-11 RX ORDER — BUSPIRONE HYDROCHLORIDE 7.5 MG/1
7.5 TABLET ORAL 3 TIMES DAILY
Qty: 90 TABLET | Refills: 1 | Status: SHIPPED | OUTPATIENT
Start: 2023-10-11

## 2023-10-11 RX ORDER — LAMOTRIGINE 25 MG/1
50 TABLET ORAL DAILY
Qty: 180 TABLET | Refills: 0 | Status: SHIPPED | OUTPATIENT
Start: 2023-10-11

## 2023-10-11 NOTE — PSYCH
Nephrology Progress Note Maria E Holliday Date of Admission : 3/4/2020 CC: Follow up for SONIDO Assessment and Plan SONIDO on CKD: 
- 2/2 ATN due to Gram Negative Sepsis, Hypotension ==> resolving   
- stopped IVF  
- daily labs CKD Stage IV  
- 2/2 ADPKD, HTN  
- baseline Creatinine : 2 mg/ dl  
- followed by Dr Almita Howard  
  
E coli bacteremia w/ sepsis - s/p removal of Port-a cath - abx per ID 
  
Anemia : 
- Multifactorial  
- started KATHY Esophageal Ca Hypothyroidism EMILY Interval History: 
Seen and examined. . No complaints . Denies any abdo pain/N/V/CP/SOB Current Medications: all current  Medications have been eviewed in [facility] Review of Systems: Pertinent items are noted in HPI. Objective: 
Vitals:   
Vitals:  
 03/11/20 0543 03/11/20 6863 03/11/20 1209 03/11/20 1552 BP:  148/46 132/79 122/72 Pulse:  68 62 63 Resp:  18 18 18 Temp:  97.8 °F (36.6 °C) 98.1 °F (36.7 °C) 98.6 °F (37 °C) SpO2:  98% 94% 99% Weight: 111.6 kg (246 lb) Height:      
 
Intake and Output: 
No intake/output data recorded. 03/09 1901 - 03/11 0700 In: 3259.2 [P.O.:740; I.V.:2519.2] Out: 4300 [PBDWS:8735] Physical Examination: 
 
General: No distress Neck:  Supple, no mass Resp:  Reduced bibasilar breath sounds CV:  RRR,  no murmur or rub, no LE edema GI:  Soft, NT, + Bowel sounds, no hepatosplenomegaly Neurologic:  Non focal 
Psych:             AAO x 3 appropriate affect Skin:  Pigmentation of both hands :  No cuello [x]    High complexity decision making was performed 
[x]    Patient is at high-risk of decompensation with multiple organ involvement Lab Data Personally Reviewed: I have reviewed all the pertinent labs, microbiology data and radiology studies during assessment. Recent Labs  
  03/11/20 
0158 03/10/20 
1727 03/09/20 
0515 03/09/20 
3962 * 145  --  144  
K 3.6 3.5  --  3.7 * 112*  --  113* CO2 28 27  --  25  
 Virtual Regular Visit    Verification of patient location:    Patient is located at Home in the following state in which I hold an active license [place]      Assessment/Plan:    Problem List Items Addressed This Visit    None      Goals addressed in session: Goal 1          Reason for visit is   Chief Complaint   Patient presents with    Medication Management        Encounter provider Marlo Elizalde MD    Provider located at [address] [place] [address],[address]  [address]  [phone]      Recent Visits  No visits were found meeting these conditions. Showing recent visits within past 7 days and meeting all other requirements  Today's Visits  Date Type Provider Dept   10/11/23 Telemedicine Marlo Elizalde, [address] today's visits and meeting all other requirements  Future Appointments  No visits were found meeting these conditions. Showing future appointments within next 150 days and meeting all other requirements       The patient was identified by name and date of birth. Layla Diamond was informed that this is a telemedicine visit and that the visit is being conducted [facility]ought[facility] [place]. She agrees to proceed. .  My office door was closed. No one else was in the room. She acknowledged consent and understanding of privacy and security of the video platform. The patient has agreed to participate and understands they can discontinue the visit at any time. Patient is aware this is a billable service. Subjective  Layla Diamond is a 62 y.o. female with bipolar do and anxiety presents for regular f/u   Off seroquel for 2 weeks (SE)  Continued Lamictal  Pt is applying for disability  PSS; plans to visit [facility]  Pt had genetic testing done; "safe" meds are vraylar, latuda, invega, geodon. Pt reports failed trials - SE on all of them   .       HPI   Mood - denies depression, but fast speech, racing * 105*  --  114* BUN 60* 72*  --  82* CREA 2.19* 2.40*  --  2.70* CA 8.4* 8.1*  --  8.5 MG 1.5* 1.5*  --  1.8 PHOS 2.6 3.0  --  3.7 INR  --   --  1.6*  --   
 
Recent Labs  
  03/11/20 
0158 03/10/20 
0428 03/09/20 
1557 WBC 6.4 5.5 6.4 HGB 7.6* 7.4* 8.1* HCT 24.3* 23.5* 25.0*  
* 94* 101* No results found for: SDES Lab Results Component Value Date/Time Culture result: NO GROWTH 3 DAYS 03/08/2020 04:03 AM  
 Culture result:  03/06/2020 09:15 AM  
  MRSA NOT PRESENT. Apparent Staphylococus aureus (not MRSA noted). Culture result:  03/06/2020 09:15 AM  
      Screening of patient nares for MRSA is for surveillance purposes and, if positive, to facilitate isolation considerations in high risk settings. It is not intended for automatic decolonization interventions per se as regimens are not sufficiently effective to warrant routine use. Recent Results (from the past 24 hour(s)) GLUCOSE, POC Collection Time: 03/10/20  4:35 PM  
Result Value Ref Range Glucose (POC) 108 (H) 65 - 100 mg/dL Performed by Barb Cotter GLUCOSE, POC Collection Time: 03/10/20 11:09 PM  
Result Value Ref Range Glucose (POC) 132 (H) 65 - 100 mg/dL Performed by Gabrielle Carmona MAGNESIUM Collection Time: 03/11/20  1:58 AM  
Result Value Ref Range Magnesium 1.5 (L) 1.6 - 2.4 mg/dL PHOSPHORUS Collection Time: 03/11/20  1:58 AM  
Result Value Ref Range Phosphorus 2.6 2.6 - 4.7 MG/DL  
CBC WITH AUTOMATED DIFF Collection Time: 03/11/20  1:58 AM  
Result Value Ref Range WBC 6.4 3.6 - 11.0 K/uL  
 RBC 2.48 (L) 3.80 - 5.20 M/uL HGB 7.6 (L) 11.5 - 16.0 g/dL HCT 24.3 (L) 35.0 - 47.0 % MCV 98.0 80.0 - 99.0 FL  
 MCH 30.6 26.0 - 34.0 PG  
 MCHC 31.3 30.0 - 36.5 g/dL  
 RDW 19.4 (H) 11.5 - 14.5 % PLATELET 230 (L) 878 - 400 K/uL MPV 11.3 8.9 - 12.9 FL  
 NRBC 0.0 0  WBC ABSOLUTE NRBC 0.00 0.00 - 0.01 K/uL NEUTROPHILS 74 32 - 75 % thoughts  Anxiety - increased worries, " catastrophic" thoughts and feeling " panicky"; poor sleep    Past Medical History:   Diagnosis Date    Anxiety     Bipolar disorder (HCC)     COPD (chronic obstructive pulmonary disease) (HCC)     Hyperlipidemia     PTSD (post-traumatic stress disorder)     Seizures (HCC)        Past Surgical History:   Procedure Laterality Date    CEREBRAL ANEURYSM REPAIR      X2    COLONOSCOPY         Current Outpatient Medications   Medication Sig Dispense Refill    lamoTRIgine (LaMICtal) 25 mg tablet Take 2 tablets (50 mg total) by mouth daily 180 tablet 0    albuterol (PROVENTIL HFA,VENTOLIN HFA) 90 mcg/act inhaler INHALE 1 PUFF EVERY 4 TO 6 HOURS AS NEEDED      atorvastatin (LIPITOR) 40 mg tablet Take 40 mg by mouth daily      fluticasone (FLONASE) 50 mcg/act nasal spray SPRAY 1 SPRAY INTO EACH NOSTRIL EVERY DAY FOR 90 DAYS      QUEtiapine (SEROquel) 100 mg tablet Take 1-2 tab at night 60 tablet 1     No current facility-administered medications for this visit. Allergies   Allergen Reactions    Naproxen GI Intolerance    Percocet [Oxycodone-Acetaminophen] GI Intolerance       Review of Systems   Constitutional:  Negative for activity change and appetite change. Psychiatric/Behavioral:  Positive for sleep disturbance. Negative for dysphoric mood and suicidal ideas. The patient is nervous/anxious. Video Exam    There were no vitals filed for this visit. Physical Exam  Constitutional:       Appearance: Normal appearance. She is normal weight. Neurological:      Mental Status: She is alert. Psychiatric:         Attention and Perception: Attention and perception normal.         Mood and Affect: Mood is anxious. Affect is blunt. Behavior: Behavior normal. Behavior is cooperative. Thought Content:  Thought content normal.         Judgment: Judgment normal.      Comments: Fast speech          Visit Time    Visit Start Time: 2.16 pm   Visit Stop Time: 2.28 LYMPHOCYTES 9 (L) 12 - 49 % MONOCYTES 12 5 - 13 % EOSINOPHILS 2 0 - 7 % BASOPHILS 0 0 - 1 % IMMATURE GRANULOCYTES 3 (H) 0.0 - 0.5 % ABS. NEUTROPHILS 4.7 1.8 - 8.0 K/UL  
 ABS. LYMPHOCYTES 0.6 (L) 0.8 - 3.5 K/UL  
 ABS. MONOCYTES 0.8 0.0 - 1.0 K/UL  
 ABS. EOSINOPHILS 0.1 0.0 - 0.4 K/UL  
 ABS. BASOPHILS 0.0 0.0 - 0.1 K/UL  
 ABS. IMM. GRANS. 0.2 (H) 0.00 - 0.04 K/UL  
 DF SMEAR SCANNED    
 RBC COMMENTS ANISOCYTOSIS 1+ 
    
 RBC COMMENTS MACROCYTOSIS 1+ 
    
 RBC COMMENTS MICROCYTOSIS 2+ 
    
 RBC COMMENTS HYPOCHROMIA 1+ METABOLIC PANEL, BASIC Collection Time: 20  1:58 AM  
Result Value Ref Range Sodium 146 (H) 136 - 145 mmol/L Potassium 3.6 3.5 - 5.1 mmol/L Chloride 112 (H) 97 - 108 mmol/L  
 CO2 28 21 - 32 mmol/L Anion gap 6 5 - 15 mmol/L Glucose 124 (H) 65 - 100 mg/dL BUN 60 (H) 6 - 20 MG/DL Creatinine 2.19 (H) 0.55 - 1.02 MG/DL  
 BUN/Creatinine ratio 27 (H) 12 - 20 GFR est AA 26 (L) >60 ml/min/1.73m2 GFR est non-AA 22 (L) >60 ml/min/1.73m2 Calcium 8.4 (L) 8.5 - 10.1 MG/DL  
GLUCOSE, POC Collection Time: 20  5:41 AM  
Result Value Ref Range Glucose (POC) 124 (H) 65 - 100 mg/dL Performed by Imelda Conde MD 
[address] [place] [address], [address] [facility] Phone - [phone] Fax - [phone] 
www. [email or web address] 
 pm  Total Visit Duration:  20 minutes

## 2023-10-11 NOTE — PATIENT INSTRUCTIONS
Continue lamictal   Add buspar 7.5 mg tid  Pt declined to restart seroquel or another antipsychotic  Will monitor for prashant

## 2023-10-31 ENCOUNTER — TELEMEDICINE (OUTPATIENT)
Dept: BEHAVIORAL/MENTAL HEALTH CLINIC | Facility: CLINIC | Age: 58
End: 2023-10-31
Payer: MEDICARE

## 2023-10-31 DIAGNOSIS — F41.1 GENERALIZED ANXIETY DISORDER: Primary | ICD-10-CM

## 2023-10-31 PROCEDURE — 90834 PSYTX W PT 45 MINUTES: CPT | Performed by: PSYCHOLOGIST

## 2023-10-31 NOTE — PSYCH
Behavioral Health Psychotherapy Progress Note    Psychotherapy Provided: Individual Psychotherapy     No diagnosis found. Goals addressed in session: Goal 1     DATA:   Reached client on amwell embedded  Went to to gathering with friends on Saturday, spent one night. Missed her exit the first time, but caught it and found her way. There was a potluck and camp fire. Went to bed at 3 am.  Others up by 5 or 6. Up at 8 am.  [employer] TV,  [employer] - both have adds but live TV and [facility] (in [place]) living off the land. 1525/mo bills 1294/mo. Has been having bad dreams at night - "drowning" "riding a horse" "[place] Ham drove through water and we were submerged" "places I used to work."  Therapist listened attentively and encouraged client to share her fears and confusion. Talked about ways to help her process her bad dreams. Helped client talk through one particular dream and bring some insight to bear. Encouraged client to continue to draw pictures of her dreams as a way of processing the emotional content. During this session, this clinician used the following therapeutic modalities: Cognitive Behavioral Therapy    Substance Abuse was not addressed during this session. If the client is diagnosed with a co-occurring substance use disorder, please indicate any changes in the frequency or amount of use: no change. Stage of change for addressing substance use diagnoses: Maintenance    ASSESSMENT:  Eda Gomez presents with a Euthymic/ normal and Anxious mood. her affect is Normal range and intensity, which is congruent, with her mood and the content of the session. The client has made progress on their goals. Eda Gomez presents with a low risk of suicide, low risk of self-harm, and low risk of harm to others. For any risk assessment that surpasses a "low" rating, a safety plan must be developed.     A safety plan was indicated: no  If yes, describe in detail reach out to friends and family for empathy and support  PLAN: Between sessions, Madhavi Mcgowan will continue to use good coping skills. Continue to notice emotional content of dreams and draw pictures as she feels able. . At the next session, the therapist will use Cognitive Behavioral Therapy to address emotional overwhelm and anxieties. Behavioral Health Treatment Plan and Discharge Planning: Madhavi Mcgowan is aware of and agrees to continue to work on their treatment plan. They have identified and are working toward their discharge goals. yes    Visit start and stop times:    10/31/23  Start Time: 436669510/31/23  Start Time: 4010  Stop Time: 1345  Total Visit Time: 40 minutes    Virtual Regular Visit    Verification of patient location:    Patient is located at Home in the following state in which I hold an active license [place]      Assessment/Plan:    Problem List Items Addressed This Visit    None      Goals addressed in session: Goal 1          Reason for visit is   Chief Complaint   Patient presents with    Virtual Regular Visit          Encounter provider Lauren Haddad, PhD    Provider located at [address] [place] [address]  [phone]      Recent Visits  No visits were found meeting these conditions. Showing recent visits within past 7 days and meeting all other requirements  Today's Visits  Date Type Provider Dept   10/31/23 Telemedicine Lauren Haddad, PhD Ayush Campbell today's visits and meeting all other requirements  Future Appointments  No visits were found meeting these conditions. Showing future appointments within next 150 days and meeting all other requirements       The patient was identified by name and date of birth. Rhonda Hinkle was informed that this is a telemedicine visit and [facility] She agrees to proceed. .  My office door was closed. No one else was in the room. She acknowledged consent and understanding of privacy and security of the video platform. The patient has agreed to participate and understands they can discontinue the visit at any time. Patient is aware this is a billable service. Katie Angel is a 62 y.o. female who was friendly and cooperative . HPI     Past Medical History:   Diagnosis Date    Anxiety     Bipolar disorder ([address])     COPD (chronic obstructive pulmonary disease) ([facility])     Hyperlipidemia     PTSD (post-traumatic stress disorder)     Seizures ([facility])        Past Surgical History:   Procedure Laterality Date    CEREBRAL ANEURYSM REPAIR      X2    COLONOSCOPY         Current Outpatient Medications   Medication Sig Dispense Refill    albuterol (PROVENTIL HFA,VENTOLIN HFA) 90 mcg/act inhaler INHALE 1 PUFF EVERY 4 TO 6 HOURS AS NEEDED      atorvastatin (LIPITOR) 40 mg tablet Take 40 mg by mouth daily      busPIRone (BUSPAR) 7.5 mg tablet Take 1 tablet (7.5 mg total) by mouth 3 (three) times a day 90 tablet 1    fluticasone (FLONASE) 50 mcg/act nasal spray SPRAY 1 SPRAY INTO EACH NOSTRIL EVERY DAY FOR 90 DAYS      lamoTRIgine (LaMICtal) 25 mg tablet Take 2 tablets (50 mg total) by mouth daily 180 tablet 0     No current facility-administered medications for this visit. Allergies   Allergen Reactions    Naproxen GI Intolerance    Percocet [Oxycodone-Acetaminophen] GI Intolerance       Review of Systems    Video Exam    There were no vitals filed for this visit.     Physical Exam     Visit Time  10/31/23  Start Time: 5257  Stop Time: 3471  Total Visit Time: 40 minutes

## 2023-11-08 ENCOUNTER — TELEPHONE (OUTPATIENT)
Dept: PSYCHIATRY | Facility: CLINIC | Age: 58
End: 2023-11-08

## 2023-11-08 DIAGNOSIS — F41.1 GENERALIZED ANXIETY DISORDER: ICD-10-CM

## 2023-11-08 NOTE — TELEPHONE ENCOUNTER
Called and left message for client cancelling appt. today at 2:00 pm with Dr. Bryant Crouch due to illness and to reschedule.

## 2023-11-09 RX ORDER — BUSPIRONE HYDROCHLORIDE 7.5 MG/1
TABLET ORAL
Qty: 90 TABLET | Refills: 1 | Status: SHIPPED | OUTPATIENT
Start: 2023-11-09 | End: 2023-11-13

## 2023-11-09 NOTE — TELEPHONE ENCOUNTER
Pt Hardy Gomez, 1965 , SLPF chart was reviewed.  Buspirone 7.5mg qty 90 was sent to [facility]      This note was not shared with the patient due to reasonable likelihood of causing patient harm

## 2023-11-13 ENCOUNTER — TELEMEDICINE (OUTPATIENT)
Dept: PSYCHIATRY | Facility: CLINIC | Age: 58
End: 2023-11-13
Payer: MEDICARE

## 2023-11-13 DIAGNOSIS — F41.1 GENERALIZED ANXIETY DISORDER: ICD-10-CM

## 2023-11-13 DIAGNOSIS — F31.81 BIPOLAR II DISORDER (HCC): Primary | ICD-10-CM

## 2023-11-13 PROCEDURE — 99214 OFFICE O/P EST MOD 30 MIN: CPT | Performed by: PSYCHIATRY & NEUROLOGY

## 2023-11-13 RX ORDER — TRAZODONE HYDROCHLORIDE 50 MG/1
50 TABLET ORAL
Qty: 90 TABLET | Refills: 0 | Status: SHIPPED | OUTPATIENT
Start: 2023-11-13

## 2023-11-13 NOTE — PATIENT INSTRUCTIONS
Continue lamictal  Increase buspar 2 tab bid - pt would like to use her supply  Add trazodone 50 mg hs  Pt declined antipsychotic - concerned about SE

## 2023-11-13 NOTE — PSYCH
Virtual Regular Visit    Verification of patient location:    Patient is located at Home in the following state in which I hold an active license [place]      Assessment/Plan:    Problem List Items Addressed This Visit    None      Goals addressed in session: Goal 1          Reason for visit is   Chief Complaint   Patient presents with    Medication Management        Encounter provider Venkatesh Lynch MD    Provider located at [address] [place] [address],[address]  [facility]  [address] [address] [place] [address]  [phone]      Recent Visits  Date Type Provider Dept   11/08/23 Telephone [facility] 121 recent visits within past 7 days and meeting all other requirements  Today's Visits  Date Type Provider Dept   11/13/23 Telemedicine Venkatesh Lynch, [address] today's visits and meeting all other requirements  Future Appointments  No visits were found meeting these conditions. Showing future appointments within next 150 days and meeting all other requirements       The patient was identified by name and date of birth. Antwan Rosa was informed that this is a telemedicine visit and [facility]. She agrees to proceed. .  My office door was closed. No one else was in the room. She acknowledged consent and understanding of privacy and security of the video platform. The patient has agreed to participate and understands they can discontinue the visit at any time. Patient is aware this is a billable service. Subjective  Antwan Rosa is a 62 y.o. female with bipolar do and anxiety presents for regular f/u  .   Compliant with meds, denies SE  BGM - improved on exercises; pt is not on a diet      HPI   Mood - continues to have mood swings, sometimes gets irritable and angry  Anxiety - slightly better - racing negative thoughts; anticipating anxiety; feeling " panicky" SOB and clenching     Past Medical History:   Diagnosis Date    Anxiety     Bipolar disorder ([facility])     COPD (chronic obstructive pulmonary disease) (H[facility])     Hyperlipidemia     PTSD (post-traumatic stress disorder)     Seizures (H[facility])        Past Surgical History:   Procedure Laterality Date    CEREBRAL ANEURYSM REPAIR      X2    COLONOSCOPY         Current Outpatient Medications   Medication Sig Dispense Refill    albuterol (PROVENTIL HFA,VENTOLIN HFA) 90 mcg/act inhaler INHALE 1 PUFF EVERY 4 TO 6 HOURS AS NEEDED      atorvastatin (LIPITOR) 40 mg tablet Take 40 mg by mouth daily      busPIRone (BUSPAR) 7.5 mg tablet Buspirone 7.5mg : 1 tablet po x 3 daily 90 tablet 1    fluticasone (FLONASE) 50 mcg/act nasal spray SPRAY 1 SPRAY INTO EACH NOSTRIL EVERY DAY FOR 90 DAYS      lamoTRIgine (LaMICtal) 25 mg tablet Take 2 tablets (50 mg total) by mouth daily 180 tablet 0     No current facility-administered medications for this visit. Allergies   Allergen Reactions    Naproxen GI Intolerance    Percocet [Oxycodone-Acetaminophen] GI Intolerance       Review of Systems   Constitutional:  Negative for activity change and appetite change. Psychiatric/Behavioral:  Positive for dysphoric mood and sleep disturbance (poor sleep, racing thoughts). Negative for suicidal ideas. The patient is nervous/anxious. Video Exam    There were no vitals filed for this visit. Physical Exam  Constitutional:       Appearance: Normal appearance. She is normal weight. Neurological:      Mental Status: She is alert. Psychiatric:         Attention and Perception: Attention and perception normal.         Mood and Affect: Affect normal. Mood is anxious. Speech: Speech normal.         Behavior: Behavior normal. Behavior is cooperative. Thought Content:  Thought content normal.         Judgment: Judgment normal.          Visit Time    Visit Start Time: 1.38 pm   Visit Stop Time: 1.49 pm   Total Visit Duration:  18 minutes

## 2023-11-14 ENCOUNTER — TELEPHONE (OUTPATIENT)
Dept: PSYCHIATRY | Facility: CLINIC | Age: 58
End: 2023-11-14

## 2023-11-14 DIAGNOSIS — F41.1 GENERALIZED ANXIETY DISORDER: Primary | ICD-10-CM

## 2023-11-14 NOTE — TELEPHONE ENCOUNTER
Pt calling as pharmacy did not receive script for buspirone and wants to make sure they have one before she runs out of meds. Pharmacy did not receive script for increased buspirone dose.

## 2023-11-15 RX ORDER — BUSPIRONE HYDROCHLORIDE 15 MG/1
15 TABLET ORAL 2 TIMES DAILY
Qty: 60 TABLET | Refills: 0 | Status: SHIPPED | OUTPATIENT
Start: 2023-11-15

## 2023-11-21 ENCOUNTER — TELEMEDICINE (OUTPATIENT)
Dept: BEHAVIORAL/MENTAL HEALTH CLINIC | Facility: CLINIC | Age: 58
End: 2023-11-21
Payer: MEDICARE

## 2023-11-21 DIAGNOSIS — F41.1 GENERALIZED ANXIETY DISORDER: Primary | ICD-10-CM

## 2023-11-21 PROCEDURE — 90834 PSYTX W PT 45 MINUTES: CPT | Performed by: PSYCHOLOGIST

## 2023-11-21 NOTE — PSYCH
Individual Therapy Note  Behavioral Health Psychotherapy Progress Note    Psychotherapy Provided: Individual Psychotherapy     DATA:   Reached client on staci john. Client stated she got coffee and milk. Shonda Villalpando is going to bring me dinner, which I watch her dog. I already have Roxane Myers (she is pretty mellow)  Audi Corado told me she got a cat - a friend found it in the yard and gave it to [facility] and the [facility] wants to give the cat to me. Longer hair, orange tiger. She has had cats in the past and enjoyed them. But she stated she does not have the financial resources to pay for the care and upkeep of a cat. She has about 3 months worth of cat litter, but hates to be beholden to her friend for vet fees and basic cat expenses. She will continue to think about this option. Busporone 15 mg 2/day. Not sleeping through the night. Dreams are waking her up in a panic. Back in school, I did well, As. But I had OCD and sometimes I didn't finish things because of my OCD. In dream I was going to school and I didn't have my homework done. She was somewhat upset about her dream, even though it was not frightening, it was still upsetting. Hx Wellbutrin and lorazapam - which worked well. Stated she wished that she could be prescribed these meds again, but acknowledged the change in prescribing patterns now. Client shared that she was still feeling slightly ill with a cold and asked for the session to end a few minutes early. She stated that she didn't really have much to say. But client then continued to talk about the cat, her feelings, her worry about saving money - but not too much. She appeared surprised at how long she had spent talking    Therapist provided empathy and support. Listened attentively as client sequenced through thoughts and emotions and assisted client in gaining some insight into their problems and concerns.   Validated client's emotions and frustrations while pointing to client's strengths and coping skills to help them makes sense of their circumstances and their place in their community. Client will continue to use learned coping skills and reach out to friends and family for support. During this session, this clinician used the following therapeutic modalities: Cognitive Behavioral Therapy, Client centered and trauma work, as needed. Substance Abuse was not addressed during this session. If the client is diagnosed with a co-occurring substance use disorder, please indicate any changes in the frequency or amount of use: no change. Stage of change for addressing substance use diagnoses: Maintenance    ASSESSMENT:  This Client Will Mtz presents with a Euthymic/ normal and Anxious mood. affect is Normal in range and intensity and Blunted, which is congruent, with mood and the content of the session. The client has made progress on their goals. Will Mtz presents with a low risk of suicide, low risk of self-harm, and low risk of harm to others. For any risk assessment that surpasses a "low" rating, a safety plan must be developed. A safety plan was indicated: no  If yes, describe in detail:   Reach out to friends and family for empathy and support      PLAN: Between sessions, Will Mtz will continue to use coping skills to manage anxiety and stress. At the next session, the therapist will use Client-centered Therapy, Cognitive Behavioral Therapy, Mindfulness-based Strategies, Motivational Interviewing, Supportive Psychotherapy and trauma therapy to address issues of emotional overwhelm, setting and keeping good boundaries, and using coping skills and self-care skills daily, in order to manage depression and anxiety. Behavioral Health Treatment Plan and Discharge Planning: Will Mtz is aware of and agrees to continue to work on their treatment plan. They have identified and are working toward their discharge goals.  yes      Goals addressed in session: Goal 1     Reason for visit is   Chief Complaint Patient presents with    Virtual Regular Visit       Encounter provider Soy Fields PsyD    Provider located at [address] [address]  [phone]    The patient was identified by name and date of birth. Kirsten Sims was informed that this is a telemedicine visit and that the visit is being conducted through the [employer]. S/he agrees to proceed. My office door was closed. No one else was in the room. S/he acknowledged consent and understanding of privacy and security of the video platform. The patient has agreed to participate and understands they can discontinue the visit at any time. Patient is aware this is a billable service. Katie Callahan presented as an attractive and casually dressed Kirsten Levi who appeared their stated age of 62 years. Kirsten Sims was friendly and cooperative. S/he thanked the therapist and expressed appreciation for the session. HPI Review of Systems    Video Exam    There were no vitals filed for this visit.     Physical Exam     Visit Time    11/21/23  Start Time: 6604  Stop Time: 2397  Total Visit Time: 46 minutes

## 2023-12-11 ENCOUNTER — TELEMEDICINE (OUTPATIENT)
Dept: PSYCHIATRY | Facility: CLINIC | Age: 58
End: 2023-12-11
Payer: MEDICARE

## 2023-12-11 DIAGNOSIS — F41.1 GENERALIZED ANXIETY DISORDER: ICD-10-CM

## 2023-12-11 DIAGNOSIS — G47.00 INSOMNIA, UNSPECIFIED TYPE: ICD-10-CM

## 2023-12-11 DIAGNOSIS — F31.81 BIPOLAR II DISORDER (HCC): Primary | ICD-10-CM

## 2023-12-11 PROCEDURE — 99214 OFFICE O/P EST MOD 30 MIN: CPT | Performed by: PSYCHIATRY & NEUROLOGY

## 2023-12-11 RX ORDER — TRAZODONE HYDROCHLORIDE 100 MG/1
100 TABLET ORAL
Qty: 30 TABLET | Refills: 1 | Status: SHIPPED | OUTPATIENT
Start: 2023-12-11 | End: 2023-12-12 | Stop reason: SDUPTHER

## 2023-12-11 RX ORDER — BUSPIRONE HYDROCHLORIDE 15 MG/1
15 TABLET ORAL 2 TIMES DAILY
Qty: 60 TABLET | Refills: 0 | Status: SHIPPED | OUTPATIENT
Start: 2023-12-11 | End: 2023-12-12 | Stop reason: SDUPTHER

## 2023-12-11 RX ORDER — LAMOTRIGINE 25 MG/1
50 TABLET ORAL DAILY
Qty: 180 TABLET | Refills: 0 | Status: SHIPPED | OUTPATIENT
Start: 2023-12-11

## 2023-12-11 NOTE — PATIENT INSTRUCTIONS
Increase trazodone 100 mg hs  Continue other meds and therapy  Pt declined any antipsychotics ( to augment lamictal)

## 2023-12-11 NOTE — PSYCH
Virtual Regular Visit    Verification of patient location:    Patient is located at Home in the following state in which I hold an active license [place]      Assessment/Plan:    Problem List Items Addressed This Visit    None      Goals addressed in session: Goal 1          Reason for visit is   Chief Complaint   Patient presents with    Medication Management        Encounter provider Allen George MD    Provider located at [address] [place] [address],[address]  [facility] [address] [place] [address]  [phone]      Recent Visits  No visits were found meeting these conditions. Showing recent visits within past 7 days and meeting all other requirements  Today's Visits  Date Type Provider Dept   12/11/23 Telemedicine Allen George, [address] today's visits and meeting all other requirements  Future Appointments  No visits were found meeting these conditions. Showing future appointments within next 150 days and meeting all other requirements       The patient was identified by name and date of birth. Kyle Le was informed that this is a telemedicine visit and that the visit is being conducted throughthe [facility]. She agrees to proceed. .  My office door was closed. No one else was in the room. She acknowledged consent and understanding of privacy and security of the video platform. The patient has agreed to participate and understands they can discontinue the visit at any time. Patient is aware this is a billable service. Subjective  Kyle Le is a 62 y.o. female with bipolar do and anxiety presents for regular f/u  . Compliant with meds, denies SE  Problems with heater in the house - needs repair?   BGM- controlled      HPI   Mood - reports as mostly stable; denies depression or mood swings; does ADLs  Anxiety - increased racing thoughts and worries; " all the time"; worse at night  Sleep - interrupted, 4-5 hrs  Past Medical History:   Diagnosis Date    Anxiety     Bipolar disorder (HCC)     COPD (chronic obstructive pulmonary disease) (HCC)     Hyperlipidemia     PTSD (post-traumatic stress disorder)     Seizures (HCC)        Past Surgical History:   Procedure Laterality Date    CEREBRAL ANEURYSM REPAIR      X2    COLONOSCOPY         Current Outpatient Medications   Medication Sig Dispense Refill    busPIRone (BUSPAR) 15 mg tablet Take 1 tablet (15 mg total) by mouth 2 (two) times a day 60 tablet 0    lamoTRIgine (LaMICtal) 25 mg tablet Take 2 tablets (50 mg total) by mouth daily 180 tablet 0    traZODone (DESYREL) 50 mg tablet Take 1 tablet (50 mg total) by mouth daily at bedtime 90 tablet 0    albuterol (PROVENTIL HFA,VENTOLIN HFA) 90 mcg/act inhaler INHALE 1 PUFF EVERY 4 TO 6 HOURS AS NEEDED      atorvastatin (LIPITOR) 40 mg tablet Take 40 mg by mouth daily      fluticasone (FLONASE) 50 mcg/act nasal spray SPRAY 1 SPRAY INTO EACH NOSTRIL EVERY DAY FOR 90 DAYS       No current facility-administered medications for this visit. Allergies   Allergen Reactions    Naproxen GI Intolerance    Percocet [Oxycodone-Acetaminophen] GI Intolerance       Review of Systems   Constitutional:  Negative for activity change and appetite change. Psychiatric/Behavioral:  Positive for sleep disturbance. Negative for dysphoric mood and suicidal ideas. The patient is nervous/anxious. Video Exam    There were no vitals filed for this visit. Physical Exam  Constitutional:       Appearance: Normal appearance. She is obese. Neurological:      Mental Status: She is alert. Psychiatric:         Attention and Perception: Attention and perception normal.         Mood and Affect: Mood is anxious. Affect is blunt. Speech: Speech normal.         Behavior: Behavior normal. Behavior is cooperative. Thought Content:  Thought content normal.         Judgment: Judgment normal.          Visit Time    Visit Start Time: 1.37 pm Visit Stop Time: 1.43 pm   Total Visit Duration:  15 minutes

## 2023-12-12 DIAGNOSIS — G47.00 INSOMNIA, UNSPECIFIED TYPE: ICD-10-CM

## 2023-12-12 DIAGNOSIS — F31.81 BIPOLAR II DISORDER (HCC): ICD-10-CM

## 2023-12-12 DIAGNOSIS — F41.1 GENERALIZED ANXIETY DISORDER: ICD-10-CM

## 2023-12-12 RX ORDER — TRAZODONE HYDROCHLORIDE 100 MG/1
100 TABLET ORAL
Qty: 90 TABLET | Refills: 0 | Status: SHIPPED | OUTPATIENT
Start: 2023-12-12

## 2023-12-12 RX ORDER — BUSPIRONE HYDROCHLORIDE 15 MG/1
15 TABLET ORAL 2 TIMES DAILY
Qty: 180 TABLET | Refills: 0 | Status: SHIPPED | OUTPATIENT
Start: 2023-12-12

## 2023-12-12 NOTE — TELEPHONE ENCOUNTER
Patient calling as she needs 90 day supplies of her scripts due to it being more affordable. States it is it expensive to get month to month.

## 2024-01-04 ENCOUNTER — TELEMEDICINE (OUTPATIENT)
Dept: BEHAVIORAL/MENTAL HEALTH CLINIC | Facility: CLINIC | Age: 59
End: 2024-01-04
Payer: MEDICARE

## 2024-01-04 DIAGNOSIS — F31.81 BIPOLAR II DISORDER (HCC): Primary | ICD-10-CM

## 2024-01-04 DIAGNOSIS — F41.1 GENERALIZED ANXIETY DISORDER: ICD-10-CM

## 2024-01-04 PROCEDURE — 90832 PSYTX W PT 30 MINUTES: CPT | Performed by: PSYCHOLOGIST

## 2024-01-04 NOTE — PSYCH
"  Individual Therapy Note  Behavioral Health Psychotherapy Progress Note    Psychotherapy Provided: Individual Psychotherapy     DATA:   Reached client on amwell embedded.   [employer] ([employer])- one of the bags was missing it's beads.   Client stated that she has tried to sign the encounter form, but she is getting an error message through my chart. I sent her a new form, but the [employer] computer returned her to the one she was not able to sign. She signed out and then back into the [employer]- and I sent her the encounter form a second time. Again, she was not able to sign it. We called the IT [employer] number and there was a 25 minute wait time. Sindy expressed frustration that I was not able to \"clear the system\" and let her start over again.  She stated that her Peer support person cancelled for today due to illness.  She stated that she would try and figure this out on her own -I gave her the phone number for MyChart tech support.    Sindy shared that she was happy that the holidays were over. She is frustrated with an extra fee added to her bill by management for taking away her grill and an old table.  She stated that today was not a good day, as she had been on the phone with [employer] and was frustrated that they had initially refused to send out the missing beads.    We reviewed the tx plan and crisis plan,which look current.  Sindy then stated that she needed to end the session early because she was feeling frustrated.     Therapist provided empathy and support. Listened attentively as client sequenced through thoughts and emotions and assisted client in gaining some insight into their problems and concerns.  Validated client's emotions and frustrations while pointing to client's strengths and coping skills to help them makes sense of their circumstances and their place in their community.  Client will continue to use learned coping skills and reach out to friends and family for " "support.      During this session, this clinician used the following therapeutic modalities: Cognitive Behavioral Therapy, Client centered and trauma work, as needed.     Substance Abuse was not addressed during this session. If the client is diagnosed with a co-occurring substance use disorder, please indicate any changes in the frequency or amount of use: no change. Stage of change for addressing substance use diagnoses: Maintenance    ASSESSMENT:  This Client Sindy presents with a Euthymic/ normal and Anxious mood.       affect is Normal in range and intensity and Blunted, which is congruent, with mood and the content of the session. The client has made progress on their goals.    Sindy presents with a low risk of suicide, low risk of self-harm, and low risk of harm to others.    For any risk assessment that surpasses a \"low\" rating, a safety plan must be developed.    A safety plan was indicated: no  If yes, describe in detail:   Reach out to friends and family for empathy and support      PLAN: Between sessions, Sindy will call [employer] and see if she can get some help with her account. At the next session, the therapist will use Client-centered Therapy, Cognitive Behavioral Therapy, Mindfulness-based Strategies, Motivational Interviewing, Supportive Psychotherapy and trauma therapy to address issues of emotional overwhelm, setting and keeping good boundaries, and using coping skills and self-care skills daily, in order to manage depression and anxiety.    Behavioral Health Treatment Plan and Discharge Planning: Sindy is aware of and agrees to continue to work on their treatment plan. They have identified and are working toward their discharge goals. yes      Goals addressed in session: Goal 1     Reason for visit is   Chief Complaint   Patient presents with    Virtual Regular Visit       Encounter provider Devante Ardon PsyD    Provider located at [facility]APIST [facility]  [facility] " THERAPIST MENTAL HEALTH OUTPATIENT  [address] [address]  [phone]    The patient was identified by name and date of birth. Sindy was informed that this is a telemedicine visit and that the visit is being conducted through the Epic Embedded platform. S/he agrees to proceed.  My office door was closed. No one else was in the room.  S/he acknowledged consent and understanding of privacy and security of the video platform. The patient has agreed to participate and understands they can discontinue the visit at any time.    Patient is aware this is a billable service.     Subjective  Sindy presented as an attractive and casually dressed participant who appeared their stated age of    58 years old  .  Sindy was friendly and cooperative.  S/he thanked the therapist and expressed appreciation for the session.     HPI Review of Systems    Video Exam    There were no vitals filed for this visit.    Physical Exam     Visit Time    01/04/24  Start Time: 1402  Stop Time: 1423  Total Visit Time: 21 minutes

## 2024-01-22 ENCOUNTER — TELEMEDICINE (OUTPATIENT)
Dept: PSYCHIATRY | Facility: CLINIC | Age: 59
End: 2024-01-22
Payer: MEDICARE

## 2024-01-22 DIAGNOSIS — G47.00 INSOMNIA, UNSPECIFIED TYPE: ICD-10-CM

## 2024-01-22 DIAGNOSIS — F41.1 GENERALIZED ANXIETY DISORDER: ICD-10-CM

## 2024-01-22 DIAGNOSIS — F31.81 BIPOLAR II DISORDER (HCC): Primary | ICD-10-CM

## 2024-01-22 PROCEDURE — 99214 OFFICE O/P EST MOD 30 MIN: CPT | Performed by: PSYCHIATRY & NEUROLOGY

## 2024-01-22 RX ORDER — LURASIDONE HYDROCHLORIDE 20 MG/1
20 TABLET, FILM COATED ORAL
Qty: 30 TABLET | Refills: 1 | Status: SHIPPED | OUTPATIENT
Start: 2024-01-22 | End: 2024-01-22

## 2024-01-22 RX ORDER — TRAZODONE HYDROCHLORIDE 150 MG/1
150 TABLET ORAL
Qty: 30 TABLET | Refills: 1 | Status: SHIPPED | OUTPATIENT
Start: 2024-01-22

## 2024-01-22 RX ORDER — BUSPIRONE HYDROCHLORIDE 15 MG/1
15 TABLET ORAL 2 TIMES DAILY
Qty: 180 TABLET | Refills: 0 | Status: SHIPPED | OUTPATIENT
Start: 2024-01-22

## 2024-01-22 RX ORDER — LAMOTRIGINE 100 MG/1
100 TABLET ORAL DAILY
Qty: 30 TABLET | Refills: 1 | Status: SHIPPED | OUTPATIENT
Start: 2024-01-22

## 2024-01-22 NOTE — PSYCH
"  Virtual Regular Visit    Verification of patient location:    Patient is located at Home in the following state in which I hold an active license [place]      Assessment/Plan:    Problem List Items Addressed This Visit    None      Goals addressed in session: Goal 1          Reason for visit is   Chief Complaint   Patient presents with    Medication Management        Encounter provider Ruma Wong MD    Provider located at [facility] MENTAL HEALTH OUTPATIENT  [address] [facility] [id number] [phone]      Recent Visits  No visits were found meeting these conditions.  Showing recent visits within past 7 days and meeting all other requirements  Today's Visits  Date Type Provider Dept   01/22/24 Telemedicine Ruma Wong MD [facility]   Showing today's visits and meeting all other requirements  Future Appointments  No visits were found meeting these conditions.  Showing future appointments within next 150 days and meeting all other requirements       The patient was identified by name and date of birth. Zina Frank was informed that this is a telemedicine visit and that the visit is being conducted throughthe Epic Embedded platform. She agrees to proceed..  My office door was closed. No one else was in the room.  She acknowledged consent and understanding of privacy and security of the video platform. The patient has agreed to participate and understands they can discontinue the visit at any time.    Patient is aware this is a billable service.     Subjective  Zina Frank is a 58 y.o. female with bipolar do and anxiety presents for regular f/u  .  Compliant with meds, denies SE. Pt stopped seroquel b/o elevated BGM; low sugar diet - to f/u with PCP  S/p brain aneurisms; s/p CVA in 2019  Pt c/o \" not feeling well\" today - didn`t elaborate      HPI   Mood - feeling more depressed lately; low energy, feeling worthless and guilty sometimes  Anxiety - " "increased negative racing intrusive thoughts ; \" all the time\"  Sleep - 5 hrs  Past Medical History:   Diagnosis Date    Anxiety     Bipolar disorder (HCC)     COPD (chronic obstructive pulmonary disease) (HCC)     Hyperlipidemia     PTSD (post-traumatic stress disorder)     Seizures (HCC)        Past Surgical History:   Procedure Laterality Date    CEREBRAL ANEURYSM REPAIR      X2    COLONOSCOPY         Current Outpatient Medications   Medication Sig Dispense Refill    albuterol (PROVENTIL HFA,VENTOLIN HFA) 90 mcg/act inhaler INHALE 1 PUFF EVERY 4 TO 6 HOURS AS NEEDED      atorvastatin (LIPITOR) 40 mg tablet Take 40 mg by mouth daily      busPIRone (BUSPAR) 15 mg tablet Take 1 tablet (15 mg total) by mouth 2 (two) times a day 180 tablet 0    fluticasone (FLONASE) 50 mcg/act nasal spray SPRAY 1 SPRAY INTO EACH NOSTRIL EVERY DAY FOR 90 DAYS      lamoTRIgine (LaMICtal) 25 mg tablet Take 2 tablets (50 mg total) by mouth daily 180 tablet 0    traZODone (DESYREL) 100 mg tablet Take 1 tablet (100 mg total) by mouth daily at bedtime 90 tablet 0     No current facility-administered medications for this visit.        Allergies   Allergen Reactions    Naproxen GI Intolerance    Percocet [Oxycodone-Acetaminophen] GI Intolerance       Review of Systems   Constitutional:  Negative for activity change and appetite change.   Psychiatric/Behavioral:  Positive for dysphoric mood and sleep disturbance. Negative for suicidal ideas. The patient is nervous/anxious.        Video Exam    There were no vitals filed for this visit.    Physical Exam  Constitutional:       Appearance: Normal appearance. She is obese.   Neurological:      Mental Status: She is alert.   Psychiatric:         Attention and Perception: Attention and perception normal.         Mood and Affect: Mood is anxious and depressed. Affect is blunt.         Behavior: Behavior normal. Behavior is cooperative.         Thought Content: Thought content normal.         Judgment: " Judgment normal.      Comments: Mild dysarthria , s/p CVA          Visit Time    Visit Start Time: 2.55 pm   Visit Stop Time: 3.08 pm   Total Visit Duration:  25 minutes

## 2024-01-22 NOTE — PATIENT INSTRUCTIONS
Increase lamictal 100 mg qd; continue buspar  Increase trazodone 150 mg hs  Pt declined meds with potential SE weight gain or elevated BGM

## 2024-01-24 ENCOUNTER — TELEMEDICINE (OUTPATIENT)
Dept: BEHAVIORAL/MENTAL HEALTH CLINIC | Facility: CLINIC | Age: 59
End: 2024-01-24
Payer: MEDICARE

## 2024-01-24 DIAGNOSIS — F31.81 BIPOLAR II DISORDER (HCC): Primary | ICD-10-CM

## 2024-01-24 DIAGNOSIS — F41.1 GENERALIZED ANXIETY DISORDER: ICD-10-CM

## 2024-01-24 PROCEDURE — 90832 PSYTX W PT 30 MINUTES: CPT | Performed by: PSYCHOLOGIST

## 2024-01-24 NOTE — PSYCH
"    Individual Therapy Note  Behavioral Health Psychotherapy Progress Note    Psychotherapy Provided: Individual Psychotherapy     DATA:   Reached client on staci john.   Stated that she has been feeling sick, stomach upset, headache.  Called IT about access to MyChart.   Apt with Dr. Wong - trazadone increased to 150 mg; licimtal to 100 mg, Still forwarding emails to a company  Sees peer specialist tomorrow. Stopped seroquel - for months - increasing blood sugar. Eats frozen dinners because they are less expensive    Therapist provided empathy and support. Listened attentively as client sequenced through thoughts and emotions and assisted client in gaining some insight into their problems and concerns.  Validated client's emotions and frustrations while pointing to client's strengths and coping skills to help them makes sense of their circumstances and their place in their community.  Client will continue to use learned coping skills and reach out to friends and family for support.      During this session, this clinician used the following therapeutic modalities: Cognitive Behavioral Therapy, Client centered and trauma work, as needed.     Substance Abuse was not addressed during this session. If the client is diagnosed with a co-occurring substance use disorder, please indicate any changes in the frequency or amount of use: no change. Stage of change for addressing substance use diagnoses: Maintenance    ASSESSMENT:  This Client Sindy presents with a Euthymic/ normal and Anxious mood.       affect is Normal in range and intensity and Blunted, which is congruent, with mood and the content of the session. The client has made progress on their goals.    Sindy presents with a low risk of suicide, low risk of self-harm, and low risk of harm to others.    For any risk assessment that surpasses a \"low\" rating, a safety plan must be developed.    A safety plan was indicated: no  If yes, describe in detail:   Reach " out to friends and family for empathy and support      PLAN: Between sessions, Sindy will practice coping skills, ask for help as she needs it. At the next session, the therapist will use Client-centered Therapy, Cognitive Behavioral Therapy, Mindfulness-based Strategies, Motivational Interviewing, Supportive Psychotherapy and trauma therapy to address issues of emotional overwhelm, setting and keeping good boundaries, and using coping skills and self-care skills daily, in order to manage depression and anxiety.    Behavioral Health Treatment Plan and Discharge Planning: Sindy is aware of and agrees to continue to work on their treatment plan. They have identified and are working toward their discharge goals. yes      Goals addressed in session: Goal 1     Reason for visit is   Chief Complaint   Patient presents with    Virtual Regular Visit       Encounter provider Devante Ardon PsyD    Provider located at [facility] THERAPIST [facility] THERAPIST MENTAL HEALTH OUTPATIENT  [address] [address]  [phone]    The patient was identified by name and date of birth. Sindy was informed that this is a telemedicine visit and that the visit is being conducted through the Epic Embedded platform. S/he agrees to proceed.  My office door was closed. No one else was in the room.  S/he acknowledged consent and understanding of privacy and security of the video platform. The patient has agreed to participate and understands they can discontinue the visit at any time.    Patient is aware this is a billable service.     Subjective  Sindy presented as an attractive and casually dressed participant who appeared their stated age of   58 years   .  Sindy was friendly and cooperative.  S/he thanked the therapist and expressed appreciation for the session.     HPI Review of Systems    Video Exam    There were no vitals filed for this visit.    Physical Exam     Visit Time  01/24/24  Start  Time: 1505  Stop Time: 1535  Total Visit Time: 30 minutes

## 2024-01-25 ENCOUNTER — TELEPHONE (OUTPATIENT)
Dept: PSYCHIATRY | Facility: CLINIC | Age: 59
End: 2024-01-25

## 2024-01-25 NOTE — TELEPHONE ENCOUNTER
Left voicemail informing patient of the Psych Encounter form needing to be signed as a requirement from the insurance company for billing purposes. Patient can access form via [employer] and sign electronically.     Please make patient aware this form must be signed for each visit as a requirement to continue future visits with provider.

## 2024-02-14 ENCOUNTER — TELEMEDICINE (OUTPATIENT)
Dept: BEHAVIORAL/MENTAL HEALTH CLINIC | Facility: CLINIC | Age: 59
End: 2024-02-14
Payer: MEDICARE

## 2024-02-14 DIAGNOSIS — F31.81 BIPOLAR II DISORDER (HCC): Primary | ICD-10-CM

## 2024-02-14 PROCEDURE — 90832 PSYTX W PT 30 MINUTES: CPT | Performed by: PSYCHOLOGIST

## 2024-02-14 NOTE — BH TREATMENT PLAN
Outpatient Behavioral Health Psychotherapy Treatment Plan     Sindy Frank  1965      Date of Initial Psychotherapy Assessment: 5/17/23   Date of Current Treatment Plan: 09/18/23  Treatment Plan Target Date: review in six month  Treatment Plan Expiration Date: review in six months     Diagnosis:   1. Bipolar affective disorder, currently depressed, mild (HCC)                Area(s) of Need: client had stroke and has some cognitive deficits, Anxiety makes it difficult for her to go out of the house, Sometimes.      Long Term Goal 1 (in the client's own words):  I want to continue to walk daily, because that helps me manage my moods and keeps me feeling positive.  (continued 9/18/23)  I want to look for a place where I would feel comfortable volunteering.    Maindly, I want to get my meds straightened out.   Stage of Change: Preparation     Target Date for completion: Review in six months             Anticipated therapeutic modalities: Cognitive behavioral therapy, supportive therapy,              People identified to complete this goal: Sindy                    Objective 1: (identify the means of measuring success in meeting the objective):  I want to continue to engage in work/volunteer/social activities. I will check out the [employer] and see if there is a cooking opportunity there for me. Client wanted to keep this goal - will go and have lunch with her peer specialist at [employer]. I want to continue doing my [employer].                     Objective 2: (identify the means of measuring success in meeting the objective): I want to continue to reach out to friends and accept social engagements when I can, to help me manage my anxiety and help me manage my agoraphobia. (continued 9/18/23)  continued 2/14/24       I am currently under the care of a [facility] psychiatric provider: yes     My [facility] psychiatric provider is: Ruma Wong     I am currently taking psychiatric medications: Yes, as  prescribed     I feel that I will be ready for discharge from mental health care when I reach the following (measurable goal/objective): when I am emotionally stable     For children and adults who have a legal guardian:          Has there been any change to custody orders and/or guardianship status? NA. If yes, attach updated documentation.     I have created my Crisis Plan and have been offered a copy of this plan     Behavioral Health Treatment Plan [facility]: Diagnosis and Treatment Plan explained to Sindy Frank acknowledges  an understanding of their diagnosis. Sindy Frank agrees to this treatment plan.     I have been offered a copy of this Treatment Plan. yes

## 2024-02-14 NOTE — PSYCH
"Individual Therapy Note  Behavioral Health Psychotherapy Progress Note    Psychotherapy Provided: Individual Psychotherapy     DATA:   Reached client on amwell embedded.   Said that the Trazadone is causing her to be more alert, up all night.   Marielos was in a mitchell,   Depression - really bad all weekend. No house work all weekend. Not sleeping \"makes me depressed,\"  Frustrated that Trazadone is not helping.  However, she plans to speak with her psychiatrist at next appointment and explain her symptoms and frustrations.  She shared that she is beginning to feel just a little bit better, and is hopeful that her depression will ease. She is also hoping that she will not get sick.  Her friend is \"giving her space\" which is actually stressing her more, not less. She enjoys taking care of her friend's dog, and misses the dog and going out for \"walks\" daily. She stated that her stomach was hurting and she asked to end the session early, so she could go lie down.      Therapist provided empathy and support. Listened attentively as client sequenced through thoughts and emotions and assisted client in gaining some insight into their problems and concerns.  Validated client's emotions and frustrations while pointing to client's strengths and coping skills to help them makes sense of their circumstances and their place in their community.  Client will continue to use learned coping skills and reach out to friends and family for support.      During this session, this clinician used the following therapeutic modalities: Cognitive Behavioral Therapy, Client centered and trauma work, as needed.     Substance Abuse was not addressed during this session. If the client is diagnosed with a co-occurring substance use disorder, please indicate any changes in the frequency or amount of use: no change. Stage of change for addressing substance use diagnoses: Maintenance    ASSESSMENT:  This Client Sindy presents with a Euthymic/ normal and " "Anxious mood.       affect is Normal in range and intensity and Blunted, which is congruent, with mood and the content of the session. The client has made progress on their goals.    Sindy presents with a low risk of suicide, low risk of self-harm, and low risk of harm to others.    For any risk assessment that surpasses a \"low\" rating, a safety plan must be developed.    A safety plan was indicated: no  If yes, describe in detail:   Reach out to friends and family for empathy and support      PLAN: Between sessions, Sindy will rest as necessary to manage back pain and nausea, continue to do her crafting, continue to reach out to friends. At the next session, the therapist will use Client-centered Therapy, Cognitive Behavioral Therapy, Mindfulness-based Strategies, Motivational Interviewing, Supportive Psychotherapy and trauma therapy to address issues of emotional overwhelm, setting and keeping good boundaries, and using coping skills and self-care skills daily, in order to manage depression and anxiety.    Behavioral Health Treatment Plan and Discharge Planning: Sindy is aware of and agrees to continue to work on their treatment plan. They have identified and are working toward their discharge goals. yes      Goals addressed in session: Goal 1     Reason for visit is   Chief Complaint   Patient presents with    Virtual Regular Visit       Encounter provider Devante Ardon PsyD    Provider located at [facility] THERAPIST [facility] THERAPIST MENTAL HEALTH OUTPATIENT  [address] [address] [phone]    The patient was identified by name and date of birth. Sindy was informed that this is a telemedicine visit and that the visit is being conducted through the Epic Embedded platform. S/he agrees to proceed.  My office door was closed. No one else was in the room.  S/he acknowledged consent and understanding of privacy and security of the video platform. The patient has " agreed to participate and understands they can discontinue the visit at any time.    Patient is aware this is a billable service.     Subjective  Sindy presented as an attractive and casually dressed participant who appeared their stated age of   58 years old   .  Sindy was friendly and cooperative.  S/he thanked the therapist and expressed appreciation for the session.     HPI Review of Systems    Video Exam    There were no vitals filed for this visit.     Visit Time  02/14/24  Start Time: 1700  Stop Time: 1735  Total Visit Time: 35 minutes

## 2024-03-06 ENCOUNTER — TELEMEDICINE (OUTPATIENT)
Dept: PSYCHIATRY | Facility: CLINIC | Age: 59
End: 2024-03-06
Payer: MEDICARE

## 2024-03-06 DIAGNOSIS — F41.1 GENERALIZED ANXIETY DISORDER: ICD-10-CM

## 2024-03-06 DIAGNOSIS — F31.81 BIPOLAR II DISORDER (HCC): ICD-10-CM

## 2024-03-06 PROCEDURE — 99214 OFFICE O/P EST MOD 30 MIN: CPT | Performed by: PSYCHIATRY & NEUROLOGY

## 2024-03-06 RX ORDER — LAMOTRIGINE 100 MG/1
100 TABLET ORAL DAILY
Qty: 90 TABLET | Refills: 0 | Status: SHIPPED | OUTPATIENT
Start: 2024-03-06

## 2024-03-06 RX ORDER — BUSPIRONE HYDROCHLORIDE 15 MG/1
15 TABLET ORAL 3 TIMES DAILY
Qty: 270 TABLET | Refills: 0 | Status: SHIPPED | OUTPATIENT
Start: 2024-03-06

## 2024-03-06 NOTE — PATIENT INSTRUCTIONS
Pt would like to stop trazodone  Continue lamictal and buspar tid  Continue therapy  F/u with PCP for blood work  Encouraged for smoking cessation

## 2024-03-06 NOTE — PSYCH
"  Virtual Regular Visit    Verification of patient location:    Patient is located at Home in the following state in which I hold an active license [place]      Assessment/Plan:    Problem List Items Addressed This Visit    None      Goals addressed in session: Goal 1          Reason for visit is   Chief Complaint   Patient presents with    Medication Management        Encounter provider Ruma Wong MD    Provider located at [facility] MENTAL HEALTH OUTPATIENT  [address] [facility] [id number] [phone]      Recent Visits  No visits were found meeting these conditions.  Showing recent visits within past 7 days and meeting all other requirements  Today's Visits  Date Type Provider Dept   03/06/24 Telemedicine Ruma Wong MD [facility]   Showing today's visits and meeting all other requirements  Future Appointments  No visits were found meeting these conditions.  Showing future appointments within next 150 days and meeting all other requirements       The patient was identified by name and date of birth. Zina Frank was informed that this is a telemedicine visit and that the visit is being conducted throughthe Epic Embedded platform. She agrees to proceed..  My office door was closed. No one else was in the room.  She acknowledged consent and understanding of privacy and security of the video platform. The patient has agreed to participate and understands they can discontinue the visit at any time.    Patient is aware this is a billable service.     Subjective  Zina Frank is a 58 y.o. female with bipolar do and anxiety presents for regular f/u  .  Pt started trazodone taper, c/o SE \" insomnia\"  Pt continued lamictal and buspar; denies SE  Pt started nicotine patch for smoking cessation  Pt c/o R wrist pain - in brace, improving  BGM - as per pt, she is due for blood work  She started walking 1 mile daily; likes doing meño art  HPI   Mood - continues " "to reports frequent mood swings - baseline, \" manageable\"; she does ADLs, takes care of herself  Psychosis - episodes of seeing \" shadows\" after not sleeping for 48 hrs ( improved)  Pt continues to reports intrusive \" irrational\" thoughts, gets overwhelmed sometimes  Anxiety - \" always worry\"  Sleep - 5-6 hrs, takes meds OTC for sleep  Past Medical History:   Diagnosis Date    Anxiety     Bipolar disorder (HCC)     COPD (chronic obstructive pulmonary disease) ([facility])     Hyperlipidemia     PTSD (post-traumatic stress disorder)     Seizures ([facility])        Past Surgical History:   Procedure Laterality Date    CEREBRAL ANEURYSM REPAIR      X2    COLONOSCOPY         Current Outpatient Medications   Medication Sig Dispense Refill    busPIRone (BUSPAR) 15 mg tablet Take 1 tablet (15 mg total) by mouth 2 (two) times a day 180 tablet 0    lamoTRIgine (LaMICtal) 100 mg tablet Take 1 tablet (100 mg total) by mouth daily 30 tablet 1    traZODone (DESYREL) 150 mg tablet Take 1 tablet (150 mg total) by mouth daily at bedtime 30 tablet 1    albuterol (PROVENTIL HFA,VENTOLIN HFA) 90 mcg/act inhaler INHALE 1 PUFF EVERY 4 TO 6 HOURS AS NEEDED      atorvastatin (LIPITOR) 40 mg tablet Take 40 mg by mouth daily      fluticasone (FLONASE) 50 mcg/act nasal spray SPRAY 1 SPRAY INTO EACH NOSTRIL EVERY DAY FOR 90 DAYS       No current facility-administered medications for this visit.        Allergies   Allergen Reactions    Naproxen GI Intolerance    Percocet [Oxycodone-Acetaminophen] GI Intolerance       Review of Systems   Constitutional:  Negative for activity change and appetite change.   Psychiatric/Behavioral:  Positive for sleep disturbance. Negative for dysphoric mood and suicidal ideas. The patient is nervous/anxious.        Video Exam    There were no vitals filed for this visit.    Physical Exam  Constitutional:       Appearance: Normal appearance.   Neurological:      Mental Status: She is alert.   Psychiatric:         Attention and " Perception: Perception normal. She is inattentive.         Mood and Affect: Mood is anxious. Affect is blunt.         Speech: Speech normal.         Behavior: Behavior normal. Behavior is cooperative.         Cognition and Memory: Cognition normal.         Judgment: Judgment normal.      Comments: Slightly disorganized  Pt is concerned about meds inteactions          Visit Time    Visit Start Time: 1.58 pm   Visit Stop Time: 2.11 pm   Total Visit Duration:  25 minutes

## 2024-03-18 ENCOUNTER — TELEMEDICINE (OUTPATIENT)
Dept: BEHAVIORAL/MENTAL HEALTH CLINIC | Facility: CLINIC | Age: 59
End: 2024-03-18
Payer: MEDICARE

## 2024-03-18 DIAGNOSIS — F31.81 BIPOLAR II DISORDER (HCC): Primary | ICD-10-CM

## 2024-03-18 DIAGNOSIS — F41.1 GENERALIZED ANXIETY DISORDER: ICD-10-CM

## 2024-03-18 PROCEDURE — 90834 PSYTX W PT 45 MINUTES: CPT | Performed by: PSYCHOLOGIST

## 2024-03-18 NOTE — PSYCH
"  Individual Therapy    Behavioral Health Psychotherapy Progress Note    Psychotherapy Provided: Individual Psychotherapy     No diagnosis found.    Goals addressed in session: Goal 1     DATA: Met with client on [place] is at a , but she is doing well there. Marielos works till 7 or 8 every night. I asked about Dina - she has 10 dogs to run around with. Woman returns her dog to her apartment at 4:30 pm. 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 [employer]. [employer] with the paper work she has to submit. Recovering hoarder.   Hofarhader has to touch something before they get rid of it.   Neighbor - whose groceries are these? She said \"have a good day.\" And kept walking. Felt guilty afterwards.   My mom passed. 2016. Mine she had called.   My mom just wasn't there for me - no halloween costume for the halloween parade - she just wouldn't do it. I couldn't have friend over, My mom was  5 times, one was a pervert - 4th - 9th grade. I thought my mom's divorces were because of me. We left that night. No counseling for it.   Medicaid - put a lien on the trailer -   Housing for community. - apply to have some repairs done on your house. Screens, awning, storm doors.   Hx of hoarding - three rooms are filled up. Too much craft stuff.     Therapist used a client-centered, strengths-based approach, with mindfulness skill building, CBT-informed skills, and psycho-education, within a psychodynamic framework to address client's symptoms. Client will practice skills between sessions and will report back, during subsequent sessions regarding successes and barriers.    During this session, this clinician used the following therapeutic modalities: Cognitive Behavioral Therapy, EMDR, Supportive and psychoeducational as needed.     Substance Abuse was not addressed during this session. If the client is diagnosed with a co-occurring substance use disorder, please indicate any changes in the " "frequency or amount of use: No change. Stage of change for addressing substance use diagnoses: No substance use/Not applicable    ASSESSMENT:  presents with a Euthymic/ normal and Anxious mood. And dysthymic    His/ her affect is Normal range and intensity, which is congruent, with his/her mood and the content of the session. The client has made progress on their goals.     Sindy presents with a low risk of suicide, low risk of self-harm, and low risk of harm to others.    For any risk assessment that surpasses a \"low\" rating, a safety plan must be developed.    A safety plan was indicated: no  If yes, describe in detail NA    PLAN: Between sessions, Sindy will pace herself, . At the next session, the therapist will use Cognitive Behavioral Therapy to address emotional overwhelm.    Behavioral Health Treatment Plan and Discharge Planning: Sindy is aware of and agrees to continue to work on their treatment plan. They have identified and are working toward their discharge goals. yes        Visit start and stop times:      Virtual Regular Visit    Verification of patient location:    Patient is located at Home in the following state in which I hold an active license [place]      Assessment/Plan:    Problem List Items Addressed This Visit    None      Goals addressed in session: Goal 1          Reason for visit is   Chief Complaint   Patient presents with    Virtual Regular Visit          Encounter provider Devante Ardon, PhD    Provider located at [facility] THERAPIST [facility] THERAPIST MENTAL HEALTH OUTPATIENT  [address] [facility] [id number] [phone]      Recent Visits  No visits were found meeting these conditions.  Showing recent visits within past 7 days and meeting all other requirements  Today's Visits  Date Type Provider Dept    Telemedicine Devante Ardon, PhD [facility] Therapist [facility]   Showing today's visits and meeting all other requirements  Future " Appointments  No visits were found meeting these conditions.  Showing future appointments within next 150 days and meeting all other requirements       The patient was identified by name and date of birth. Sindy was informed that this is a telemedicine visit and that the visit is being conducted through the Epic Embedded platform. He/She agrees to proceed..  My office door was closed. No one else was in the room.  HE/She acknowledged consent and understanding of privacy and security of the video platform. The patient has agreed to participate and understands they can discontinue the visit at any time.    Patient is aware this is a billable service.     Subjective  Sindy  is a 58  y.o. Participant who was pleasant and cooperative and was friendly .      HPI        Review of Systems    Video Exam    There were no vitals filed for this visit.    Physical Exam     Visit Time    03/26/24  Start Time: 1405  Stop Time: 1455  Total Visit Time: 50 minutes

## 2024-04-11 ENCOUNTER — TELEMEDICINE (OUTPATIENT)
Dept: BEHAVIORAL/MENTAL HEALTH CLINIC | Facility: CLINIC | Age: 59
End: 2024-04-11
Payer: MEDICARE

## 2024-04-11 DIAGNOSIS — F41.1 GENERALIZED ANXIETY DISORDER: ICD-10-CM

## 2024-04-11 DIAGNOSIS — F31.81 BIPOLAR II DISORDER (HCC): Primary | ICD-10-CM

## 2024-04-11 PROCEDURE — 90832 PSYTX W PT 30 MINUTES: CPT | Performed by: PSYCHOLOGIST

## 2024-04-11 NOTE — PSYCH
Individual Therapy    Behavioral Health Psychotherapy Progress Note    Psychotherapy Provided: Individual Psychotherapy     No diagnosis found.    Goals addressed in session: Goal 1     DATA: met on [employer]  Save Program - income based repayment plan.  Not sleeping at night. Her bedroom is extra warm.   Mammogram, lymphnode is swollen (not super big). And chest CT - because she's a smoker.  Marielos - lost her mo in law; Sindy was going through her crisis; she wigged out on me. 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 Paid off her car. Sindy shared that she has been feeling more anxious lately, that she is feeling frustrated with less money coming in, and still needing to maintain her home.  She is feeling more lonely, more isolated and having a harder time reaching out to her friends.  She reported that she was not feeling well, and wanted to take a nap.  She asked to end the session early.   Therapist used a client-centered, strengths-based approach, with mindfulness skill building, CBT-informed skills, and psycho-education, within a psychodynamic framework to address client's symptoms. Client will practice skills between sessions and will report back, during subsequent sessions regarding successes and barriers.    During this session, this clinician used the following therapeutic modalities: Cognitive Behavioral Therapy, EMDR, Supportive and psychoeducational as needed.     Substance Abuse was not addressed during this session. If the client is diagnosed with a co-occurring substance use disorder, please indicate any changes in the frequency or amount of use: No change. Stage of change for addressing substance use diagnoses: No substance use/Not applicable    ASSESSMENT:  presents with a Euthymic/ normal and Anxious mood. And dysthymic    His/ her affect is Normal range and intensity, which is congruent, with his/her mood and the content of the session. The client has made progress on their goals.      "Sindy presents with a low risk of suicide, low risk of self-harm, and low risk of harm to others.    For any risk assessment that surpasses a \"low\" rating, a safety plan must be developed.    A safety plan was indicated: no  If yes, describe in detail NA    PLAN: Between sessions, Sindy will reach out to friends for support continue to use safe place and strong box to help her manage overwhelming emotions. . At the next session, the therapist will use Cognitive Behavioral Therapy to address emotional overwhelm.    Behavioral Health Treatment Plan and Discharge Planning: Sindy is aware of and agrees to continue to work on their treatment plan. They have identified and are working toward their discharge goals. yes        Visit start and stop times:      Virtual Regular Visit    Verification of patient location:    Patient is located at Home in the following state in which I hold an active license [place]      Assessment/Plan:    Problem List Items Addressed This Visit    None      Goals addressed in session: Goal 1          Reason for visit is   Chief Complaint   Patient presents with    Virtual Regular Visit          Encounter provider Devante Ardon, PhD    Provider located at [facility] THERAPIST [facility] THERAPIST MENTAL HEALTH OUTPATIENT  [address] [place] [id number] [phone]      Recent Visits  No visits were found meeting these conditions.  Showing recent visits within past 7 days and meeting all other requirements  Today's Visits  Date Type Provider Dept    Telemedicine Devante Ardon, PhD [facility] Therapist [facility]   Showing today's visits and meeting all other requirements  Future Appointments  No visits were found meeting these conditions.  Showing future appointments within next 150 days and meeting all other requirements       The patient was identified by name and date of birth. Sindy was informed that this is a telemedicine visit and that the visit is " being conducted through the Epic Embedded platform. He/She agrees to proceed..  My office door was closed. No one else was in the room.  HE/She acknowledged consent and understanding of privacy and security of the video platform. The patient has agreed to participate and understands they can discontinue the visit at any time.    Patient is aware this is a billable service.     Katie Callahan  is a 58 y.o. participant who was pleasant and cooperative and anxious .      HPI        Review of Systems    Video Exam    There were no vitals filed for this visit.    Physical Exam     Visit Time    04/14/24  Start Time: 1603  Stop Time: 1634  Total Visit Time: 31 minutes

## 2024-04-29 ENCOUNTER — TELEMEDICINE (OUTPATIENT)
Dept: BEHAVIORAL/MENTAL HEALTH CLINIC | Facility: CLINIC | Age: 59
End: 2024-04-29
Payer: MEDICARE

## 2024-04-29 DIAGNOSIS — F41.1 GENERALIZED ANXIETY DISORDER: ICD-10-CM

## 2024-04-29 DIAGNOSIS — F31.81 BIPOLAR II DISORDER (HCC): Primary | ICD-10-CM

## 2024-04-29 PROCEDURE — 90832 PSYTX W PT 30 MINUTES: CPT | Performed by: PSYCHOLOGIST

## 2024-04-29 NOTE — PSYCH
"  Individual Therapy Session Virtual    Behavioral Health Psychotherapy Progress Note    Psychotherapy Provided: Individual Psychotherapy     No diagnosis found.    Goals addressed in session: Goal 1     DATA:   Watching a lot of TV,very little sleep. Anh visits at 1:15- 1:30 to get paperwork signed. Complains that she is not thinking properly, very tired. Been off Seroquel for 6 months. She fell once.  Feeling more depressed.  Doesn't have have friend. Haven't seen Marielos at all. Dina likes the dog park. She appeared sad and frustrated, lonely. However, Sindy also appeared like she was holding back her emotions and trying to accept things as they were.  She has been feeling more irritable lately, and still somewhat under the weather.  She is also worried about finances, and is looking around for a way to make a little extra spending money.    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 But, there is a big hole in her life now.  Client asked to end session early as she was expecting a visit from her Peer Specialist, and she was not feeling that well anyhow.   Therapist listened attentively as client shared that joys and challenges of her life. Helped client sequence through daily events and gain insight into precursors for difficult emotions.  Reviewed coping skills and encouraged client to continue to use positive self care skills to help maintain physical and emotional health and vitality.  Provided empathy and support and encouraged client to continue to reach out to friends and supports.    During this session, this clinician used the following therapeutic modalities: Cognitive Behavioral Therapy, supportive therapy, trauma focused therapy such as EMDR or Progressive Counting.  Therapist used a client-centered, strengths-based approach, with mindfulness skill building, CBT-informed skills, and psycho-education, within a psychodynamic framework to address " "client's symptoms. Client will practice skills between sessions and will report back, during subsequent sessions regarding successes and barriers.      Substance Abuse was not addressed during this session. If the client is diagnosed with a co-occurring substance use disorder, please indicate any changes in the frequency or amount of use: NA. Stage of change for addressing substance use diagnoses: No substance use/Not applicable    ASSESSMENT:  Sindy presents with a Euthymic/ normal and Depressed mood.     Client's affect is Normal range and intensity, which is congruent, with their mood and the content of the session. The client has made progress on their goals.      Sindy presents with a minimal risk of suicide, minimal risk of self-harm, and minimal risk of harm to others.    For any risk assessment that surpasses a \"low\" rating, a safety plan must be developed.    A safety plan was indicated: no  If yes, describe in detail NA    PLAN: Between sessions, Sindy will practice good self care skills, track sleep and moods, and reach out to family and friends for support and empathy as well as be gentle with herself. At the next session, the therapist will use Cognitive Behavioral Therapy, supportive therapy, trauma focused therapy or solution focused therapy to address emotional overwhelm, help client develop effective coping skills, process trauma and grief and recognize moments of antelmo and peace in their lives.     Behavioral Health Treatment Plan and Discharge Planning:  Sindy  is aware of and agrees to continue to work on their treatment plan. They have identified and are working toward their discharge goals. yes            Virtual Regular Visit    Verification of patient location:    Patient is located at Home in the following state in which I hold an active license [place]      Assessment/Plan:    Problem List Items Addressed This Visit    None      Goals addressed in session: Goal 1          Reason for visit is "   Chief Complaint   Patient presents with    Virtual Regular Visit          Encounter provider Devante Ardon PsyD    Provider located at [facility] THERAPIST [facility]  [facility] THERAPIST MENTAL HEALTH OUTPATIENT  [address]  [facility] [id number] [phone]      Recent Visits  No visits were found meeting these conditions.  Showing recent visits within past 7 days and meeting all other requirements  Today's Visits  Date Type Provider Dept    Telemedicine Devante Ardon, PhD [facility] Therapist [facility]   Showing today's visits and meeting all other requirements  Future Appointments  No visits were found meeting these conditions.  Showing future appointments within next 150 days and meeting all other requirements       The patient was identified by name and date of birth.   Sindy was informed that this is a telemedicine visit and that the visit is being conducted through the Epic Embedded platform. The client agrees to proceed.  My office door was closed. No one else was in the room.  The Client acknowledged consent and understanding of privacy and security of the video platform. The patient has agreed to participate and understands they can discontinue the visit at any time.    Patient is aware this is a billable service.     Subjective  Sindy is a 58 y.o. participant who was friendly and cooperative.   Paz  participated freely in the session and expressed appreciation at the end of the session.       HPI       Review of Systems    Video Exam    There were no vitals filed for this visit.    Physical Exam     Visit Time  05/29/24  Start Time: 1406  Stop Time: 1435  Total Visit Time: 29 minutes        end

## 2024-06-07 DIAGNOSIS — F41.1 GENERALIZED ANXIETY DISORDER: ICD-10-CM

## 2024-06-07 DIAGNOSIS — F31.81 BIPOLAR II DISORDER (HCC): ICD-10-CM

## 2024-06-07 NOTE — TELEPHONE ENCOUNTER
Pt requested refills on the buspirone 15mg and lamotrigine 100mg be sent to the [facility] on file

## 2024-06-10 RX ORDER — BUSPIRONE HYDROCHLORIDE 15 MG/1
15 TABLET ORAL 3 TIMES DAILY
Qty: 270 TABLET | Refills: 0 | Status: SHIPPED | OUTPATIENT
Start: 2024-06-10

## 2024-06-10 RX ORDER — LAMOTRIGINE 100 MG/1
100 TABLET ORAL DAILY
Qty: 90 TABLET | Refills: 0 | Status: SHIPPED | OUTPATIENT
Start: 2024-06-10

## 2024-06-11 ENCOUNTER — TELEMEDICINE (OUTPATIENT)
Dept: BEHAVIORAL/MENTAL HEALTH CLINIC | Facility: CLINIC | Age: 59
End: 2024-06-11
Payer: MEDICARE

## 2024-06-11 DIAGNOSIS — F31.81 BIPOLAR II DISORDER (HCC): Primary | ICD-10-CM

## 2024-06-11 PROCEDURE — 90834 PSYTX W PT 45 MINUTES: CPT | Performed by: PSYCHOLOGIST

## 2024-06-11 NOTE — PSYCH
"    Individual Therapy Session Virtual    Behavioral Health Psychotherapy Progress Note    Psychotherapy Provided: Individual Psychotherapy     No diagnosis found.    Goals addressed in session: Goal 1     DATA:   I had a horrid week.   Wed and Thursday, Sade, older lady in neighborhood.  She has two different shoes on, blood all over her leg/heel.  I suggested she call someone. She got mad at me.  Friday, Kilo (110 lbs) 80 years old, ran over, because Sade had fallen. He had gone over to walk her dog, because she was sick.  Sindy went over - her house was a mess. Smelled horrid - trash piled everywhere, groceries on floor.  Sindy had to walk out. Paramedics came.   went in and came right back out. They Put on hazmat suits before going in to get her. Took her to [facility] - to the ICU. She has 2 cats and a dog.  Kilo took [place], 2 cats still in the house. I called [employer] [facility]-  I put water and food in the door. They came out last night. Couldn't find the cats. They came back today. Not sure if they found the cats.    I always have the night terrors and dreams. Now I can see sade's face.   I had to pay off all my father's creditors in order to keep this place. So I \"bought\" it.   NOW TV - streaming with live TV 40 channels, $30 - [employer], apple tv, yetu. Dodie is on vacation hold.       Therapist listened attentively as client shared that joys and challenges of her life. Helped client sequence through daily events and gain insight into precursors for difficult emotions.  Reviewed coping skills and encouraged client to continue to use positive self care skills to help maintain physical and emotional health and vitality.  Provided empathy and support and encouraged client to continue to reach out to friends and supports.    During this session, this clinician used the following therapeutic modalities: Cognitive Behavioral Therapy, supportive therapy, trauma focused therapy such as EMDR or " "Progressive Counting.  Therapist used a client-centered, strengths-based approach, with mindfulness skill building, CBT-informed skills, and psycho-education, within a psychodynamic framework to address client's symptoms. Client will practice skills between sessions and will report back, during subsequent sessions regarding successes and barriers.      Substance Abuse was not addressed during this session. If the client is diagnosed with a co-occurring substance use disorder, please indicate any changes in the frequency or amount of use: NA. Stage of change for addressing substance use diagnoses: No substance use/Not applicable    ASSESSMENT:  Sindy presents with a Euthymic/ normal and Depressed mood.     Client's affect is Normal range and intensity, which is congruent, with their mood and the content of the session. The client has made progress on their goals.      Sindy presents with a minimal risk of suicide, minimal risk of self-harm, and minimal risk of harm to others.    For any risk assessment that surpasses a \"low\" rating, a safety plan must be developed.    A safety plan was indicated: no  If yes, describe in detail NA    PLAN: Between sessions, Sindy will practice good self care skills, track sleep and moods, and reach out to family and friends for support and empathy as well as rest more. At the next session, the therapist will use Cognitive Behavioral Therapy, supportive therapy, trauma focused therapy or solution focused therapy to address emotional overwhelm, help client develop effective coping skills, process trauma and grief and recognize moments of antelmo and peace in their lives.     Behavioral Health Treatment Plan and Discharge Planning:  Sindy  is aware of and agrees to continue to work on their treatment plan. They have identified and are working toward their discharge goals. yes            Virtual Regular Visit    Verification of patient location:    Patient is located at Home in the following " state in which I hold an active license [place]      Assessment/Plan:    Problem List Items Addressed This Visit    None      Goals addressed in session: Goal 1          Reason for visit is   Chief Complaint   Patient presents with    Virtual Regular Visit          Encounter provider Devante Ardon PsyD    Provider located at [facility] THERAPIST [facility] THERAPIST MENTAL HEALTH OUTPATIENT  [address] [place] [address] [phone]      Recent Visits  No visits were found meeting these conditions.  Showing recent visits within past 7 days and meeting all other requirements  Today's Visits  Date Type Provider Dept    Telemedicine Devante Ardon, PhD [facility] Therapist op   Showing today's visits and meeting all other requirements  Future Appointments  No visits were found meeting these conditions.  Showing future appointments within next 150 days and meeting all other requirements       The patient was identified by name and date of birth.   Sindy was informed that this is a telemedicine visit and that the visit is being conducted through the Epic Embedded platform. The client agrees to proceed.  My office door was closed. No one else was in the room.  The Client acknowledged consent and understanding of privacy and security of the video platform. The patient has agreed to participate and understands they can discontinue the visit at any time.    Patient is aware this is a billable service.     Subjective  Sindy 59 is a 59 y.o. participant who was friendly and cooperative.   Sindy  participated freely in the session and expressed appreciation at the end of the session.       HPI       Review of Systems    Video Exam    There were no vitals filed for this visit.     Physical Exam     Visit Time    06/17/24  Start Time: 1604  Stop Time: 1651  Total Visit Time: 47 minutes      end

## 2024-06-25 ENCOUNTER — TELEMEDICINE (OUTPATIENT)
Dept: BEHAVIORAL/MENTAL HEALTH CLINIC | Facility: CLINIC | Age: 59
End: 2024-06-25
Payer: MEDICARE

## 2024-06-25 DIAGNOSIS — F41.1 GENERALIZED ANXIETY DISORDER: ICD-10-CM

## 2024-06-25 DIAGNOSIS — F31.81 BIPOLAR II DISORDER (HCC): Primary | ICD-10-CM

## 2024-06-25 PROCEDURE — 90834 PSYTX W PT 45 MINUTES: CPT | Performed by: PSYCHOLOGIST

## 2024-06-25 NOTE — PSYCH
"  Individual Therapy Session Virtual    Behavioral Health Psychotherapy Progress Note    Psychotherapy Provided: Individual Psychotherapy     No diagnosis found.    Goals addressed in session: Goal 1     DATA:   Wants to start Seroquel  again- sees Dr. ISBELL on Thursday.   Currently not sleeping well at night - has major night terrors. Neighbor who was collapsed, she was scared, seeing her face. [place],   Mammogram tomorrow  Lymphnode in neck was swollen - will get ultrasound.   CAT scan of Lungs. I smoke like a steamer.  I am worried that they might find something wrong.  Client has been having nightmares - still sees the face of her neighbor, who was a very bad hoarder, scared, couldn't get up, leg was infected and bleeding. Her house was filled with trash and jesus litter. She was scared and Sindy recalls the times when she was scared to let others into her home, that they would  her - or she said, that she was harshly judging herself. The animals are all being taken care of. Her friends are blaming her for \"being involved.\"  She feels guilty that, because she responded to her neighbor's call for help, she is has brought the nightmares on herself.   Therapist listened attentively as client shared that joys and challenges of her life. Helped client sequence through daily events and gain insight into precursors for difficult emotions.  Reviewed coping skills and encouraged client to continue to use positive self care skills to help maintain physical and emotional health and vitality.  Provided empathy and support and encouraged client to continue to reach out to friends and supports.    During this session, this clinician used the following therapeutic modalities: Cognitive Behavioral Therapy, supportive therapy, trauma focused therapy such as EMDR or Progressive Counting.  Therapist used a client-centered, strengths-based approach, with mindfulness skill building, CBT-informed skills, and psycho-education, within " "a psychodynamic framework to address client's symptoms. Client will practice skills between sessions and will report back, during subsequent sessions regarding successes and barriers.      Substance Abuse was not addressed during this session. If the client is diagnosed with a co-occurring substance use disorder, please indicate any changes in the frequency or amount of use: NA. Stage of change for addressing substance use diagnoses: No substance use/Not applicable    ASSESSMENT:  Sindy presents with a Euthymic/ normal and Depressed mood.     Client's affect is Normal range and intensity, which is congruent, with their mood and the content of the session. The client has made progress on their goals.      Sindy presents with a minimal risk of suicide, minimal risk of self-harm, and minimal risk of harm to others.    For any risk assessment that surpasses a \"low\" rating, a safety plan must be developed.    A safety plan was indicated: no  If yes, describe in detail NA    PLAN: Between sessions, Sindy will practice good self care skills, track sleep and moods, and reach out to family and friends for support and empathy as well as take good care of herself. At the next session, the therapist will use Cognitive Behavioral Therapy, supportive therapy, trauma focused therapy or solution focused therapy to address emotional overwhelm, help client develop effective coping skills, process trauma and grief and recognize moments of antelmo and peace in their lives.     Behavioral Health Treatment Plan and Discharge Planning:  Sindy  is aware of and agrees to continue to work on their treatment plan. They have identified and are working toward their discharge goals. yes            Virtual Regular Visit    Verification of patient location:    Patient is located at Home in the following state in which I hold an active license [place]      Assessment/Plan:    Problem List Items Addressed This Visit    None      Goals addressed in session: " Goal 1          Reason for visit is   Chief Complaint   Patient presents with    Virtual Regular Visit          Encounter provider Devante Ardon PsyD    Provider located at [facility] THERAPIST [facility]  [facility] THERAPIST MENTAL HEALTH OUTPATIENT  [address] [facility] [address] [phone]      Recent Visits  No visits were found meeting these conditions.  Showing recent visits within past 7 days and meeting all other requirements  Today's Visits  Date Type Provider Dept    Telemedicine Devante Ardon, PhD [facility] Therapist [facility]   Showing today's visits and meeting all other requirements  Future Appointments  No visits were found meeting these conditions.  Showing future appointments within next 150 days and meeting all other requirements       The patient was identified by name and date of birth.   Sindy was informed that this is a telemedicine visit and that the visit is being conducted through the Epic Embedded platform. The client agrees to proceed.  My office door was closed. No one else was in the room.  The Client acknowledged consent and understanding of privacy and security of the video platform. The patient has agreed to participate and understands they can discontinue the visit at any time.    Patient is aware this is a billable service.     Subjective  Sindy is a 59 y.o. participant who was friendly and cooperative.   Sindy participated freely in the session and expressed appreciation at the end of the session.       HPI       Review of Systems    Video Exam    There were no vitals filed for this visit.     Physical Exam     Visit Time    06/26/24  Start Time: 1506  Stop Time: 1553  Total Visit Time: 47 minutes      end

## 2024-06-27 ENCOUNTER — TELEMEDICINE (OUTPATIENT)
Dept: PSYCHIATRY | Facility: CLINIC | Age: 59
End: 2024-06-27
Payer: MEDICARE

## 2024-06-27 DIAGNOSIS — G47.00 INSOMNIA, UNSPECIFIED TYPE: ICD-10-CM

## 2024-06-27 DIAGNOSIS — F31.81 BIPOLAR II DISORDER (HCC): Primary | ICD-10-CM

## 2024-06-27 DIAGNOSIS — F41.1 GENERALIZED ANXIETY DISORDER: ICD-10-CM

## 2024-06-27 PROCEDURE — 99214 OFFICE O/P EST MOD 30 MIN: CPT | Performed by: PSYCHIATRY & NEUROLOGY

## 2024-06-27 RX ORDER — QUETIAPINE FUMARATE 400 MG/1
400 TABLET, FILM COATED ORAL
Qty: 90 TABLET | Refills: 0 | Status: SHIPPED | OUTPATIENT
Start: 2024-06-27

## 2024-06-27 NOTE — PSYCH
Virtual Regular Visit    Verification of patient location:    Patient is located at Home in the following state in which I hold an active license [place]      Assessment/Plan:    Problem List Items Addressed This Visit    None      Goals addressed in session: Goal 1          Reason for visit is   Chief Complaint   Patient presents with    Medication Management        Encounter provider Ruma Wong MD      Recent Visits  No visits were found meeting these conditions.  Showing recent visits within past 7 days and meeting all other requirements  Today's Visits  Date Type Provider Dept   06/27/24 Telemedicine Ruma Wong MD [facility]   Showing today's visits and meeting all other requirements  Future Appointments  No visits were found meeting these conditions.  Showing future appointments within next 150 days and meeting all other requirements       The patient was identified by name and date of birth. Zina Frank was informed that this is a telemedicine visit and that the visit is being conducted throughthe Epic Embedded platform. She agrees to proceed..  My office door was closed. No one else was in the room.  She acknowledged consent and understanding of privacy and security of the video platform. The patient has agreed to participate and understands they can discontinue the visit at any time.    Patient is aware this is a billable service.     Subjective  Zina Frank is a 59 y.o. female with bipolar do and anxiety presents for regular f/u  .  Pt is on lamictal and buspar; stopped seroquel and trazodone a while ago, concerned about elevated BGM  She tries to manage BGM with a diet  2 weeks ago she had to help her neighbor after a fall, which traumatized and stressed her  I reviewed therapy notes    HPI   Mood - for 1-2 weeks - worse - increased mood swings - frequent episodes for 3-4 hrs of increased energy, excessive cleaning, racing thoughts and longer ( up to several days) episodes of depressed  "mood, low energy, poor sleep  Anxiety - racing thoughts and worries \" all the time\"  For the last week - flashbacks, panic attacks ( SOB, tight chest, tachycardia, shaking) - \" on and off\"  Sleep - poor, nightmares - for the last week  Psychosis - denies AH  Past Medical History:   Diagnosis Date    Anxiety     Bipolar disorder (HCC)     COPD (chronic obstructive pulmonary disease) (HCC)     Hyperlipidemia     PTSD (post-traumatic stress disorder)     Seizures ([facility])        Past Surgical History:   Procedure Laterality Date    CEREBRAL ANEURYSM REPAIR      X2    COLONOSCOPY         Current Outpatient Medications   Medication Sig Dispense Refill    albuterol (PROVENTIL HFA,VENTOLIN HFA) 90 mcg/act inhaler INHALE 1 PUFF EVERY 4 TO 6 HOURS AS NEEDED      Anoro Ellipta 62.5-25 MCG/ACT inhaler Inhale 1 puff daily      atorvastatin (LIPITOR) 40 mg tablet Take 40 mg by mouth daily      busPIRone (BUSPAR) 15 mg tablet Take 1 tablet (15 mg total) by mouth 3 (three) times a day 270 tablet 0    fluticasone (FLONASE) 50 mcg/act nasal spray SPRAY 1 SPRAY INTO EACH NOSTRIL EVERY DAY FOR 90 DAYS      lamoTRIgine (LaMICtal) 100 mg tablet Take 1 tablet (100 mg total) by mouth daily 90 tablet 0     No current facility-administered medications for this visit.        Allergies   Allergen Reactions    Naproxen GI Intolerance    Percocet [Oxycodone-Acetaminophen] GI Intolerance       Review of Systems   Constitutional:  Negative for activity change and appetite change.   Psychiatric/Behavioral:  Positive for dysphoric mood and sleep disturbance. Negative for suicidal ideas. The patient is nervous/anxious.        Video Exam    There were no vitals filed for this visit.    Physical Exam  Constitutional:       Appearance: Normal appearance. She is obese.   Neurological:      Mental Status: She is alert.   Psychiatric:         Attention and Perception: Attention and perception normal.         Mood and Affect: Mood is anxious and depressed. " Affect is blunt.         Behavior: Behavior normal. Behavior is cooperative.         Thought Content: Thought content normal.         Judgment: Judgment normal.      Comments: Slightly pressured , fast speech  circumstantial          Visit Time    Visit Start Time: 2.58 pm   Visit Stop Time: 3.08 pm   Total Visit Duration:  25 minutes

## 2024-07-10 ENCOUNTER — TELEMEDICINE (OUTPATIENT)
Dept: PSYCHIATRY | Facility: CLINIC | Age: 59
End: 2024-07-10
Payer: MEDICARE

## 2024-07-10 DIAGNOSIS — F41.1 GENERALIZED ANXIETY DISORDER: ICD-10-CM

## 2024-07-10 DIAGNOSIS — F31.81 BIPOLAR II DISORDER (HCC): Primary | ICD-10-CM

## 2024-07-10 DIAGNOSIS — G47.00 INSOMNIA, UNSPECIFIED TYPE: ICD-10-CM

## 2024-07-10 PROCEDURE — 99214 OFFICE O/P EST MOD 30 MIN: CPT | Performed by: PSYCHIATRY & NEUROLOGY

## 2024-07-10 NOTE — PSYCH
"  Virtual Regular Visit    Verification of patient location:    Patient is located at Home in the following state in which I hold an active license [place]      Assessment/Plan:    Problem List Items Addressed This Visit    None      Goals addressed in session: Goal 1          Reason for visit is   Chief Complaint   Patient presents with    Medication Management        Encounter provider Ruma Wong MD      Recent Visits  No visits were found meeting these conditions.  Showing recent visits within past 7 days and meeting all other requirements  Today's Visits  Date Type Provider Dept   07/10/24 Telemedicine Ruma Wong MD [facility]   Showing today's visits and meeting all other requirements  Future Appointments  No visits were found meeting these conditions.  Showing future appointments within next 150 days and meeting all other requirements       The patient was identified by name and date of birth. Zina Frank was informed that this is a telemedicine visit and that the visit is being conducted throughthe Epic Embedded platform. She agrees to proceed..  My office door was closed. No one else was in the room.  She acknowledged consent and understanding of privacy and security of the video platform. The patient has agreed to participate and understands they can discontinue the visit at any time.    Patient is aware this is a billable service.     Subjective  Zina Frank is a 59 y.o. female with bipolar do and anxiety presents for f/u  .  Compliant with meds, denies SE  H/o elevated BGM - on a diet; f/u with PCP in october    HPI   Mood - unstable; continues to have mood swings; \" depends on the day\" - either increased energy, working on her meño art for 9-10 hrs non stop, not eating or depressed, with low energy, low motivation, anhedonia.  On seroquel sleeps better, 6 hrs, but interrupted; still c/o vivid dreams. Pt stays up until  2-3 am, cleaning or doing crafts  Anxiety - racing thoughts, \" " "always\"; denies panic attacks  Past Medical History:   Diagnosis Date    Anxiety     Bipolar disorder (HCC)     COPD (chronic obstructive pulmonary disease) (HCC)     Hyperlipidemia     PTSD (post-traumatic stress disorder)     Seizures (HCC)        Past Surgical History:   Procedure Laterality Date    CEREBRAL ANEURYSM REPAIR      X2    COLONOSCOPY         Current Outpatient Medications   Medication Sig Dispense Refill    albuterol (PROVENTIL HFA,VENTOLIN HFA) 90 mcg/act inhaler INHALE 1 PUFF EVERY 4 TO 6 HOURS AS NEEDED      Anoro Ellipta 62.5-25 MCG/ACT inhaler Inhale 1 puff daily      atorvastatin (LIPITOR) 40 mg tablet Take 40 mg by mouth daily      busPIRone (BUSPAR) 15 mg tablet Take 1 tablet (15 mg total) by mouth 3 (three) times a day 270 tablet 0    fluticasone (FLONASE) 50 mcg/act nasal spray SPRAY 1 SPRAY INTO EACH NOSTRIL EVERY DAY FOR 90 DAYS      lamoTRIgine (LaMICtal) 100 mg tablet Take 1 tablet (100 mg total) by mouth daily 90 tablet 0    QUEtiapine (SEROquel) 400 MG tablet Take 1 tablet (400 mg total) by mouth daily at bedtime 90 tablet 0     No current facility-administered medications for this visit.        Allergies   Allergen Reactions    Naproxen GI Intolerance    Percocet [Oxycodone-Acetaminophen] GI Intolerance       Review of Systems   Constitutional:  Negative for activity change and appetite change.   Endocrine:        H/o elevated BGM, not on meds   Psychiatric/Behavioral:  Positive for dysphoric mood and sleep disturbance. Negative for suicidal ideas. The patient is nervous/anxious.        Video Exam    There were no vitals filed for this visit.    Physical Exam  Constitutional:       Appearance: Normal appearance. She is obese.   Neurological:      Mental Status: She is alert.   Psychiatric:         Attention and Perception: Attention and perception normal.         Mood and Affect: Mood is anxious and depressed. Affect is blunt.         Speech: Speech normal.         Behavior: Behavior " normal. Behavior is cooperative.         Thought Content: Thought content normal.         Judgment: Judgment normal.          Visit Time    Visit Start Time: 4.28 pm   Visit Stop Time: 4.36 pm  Total Visit Duration:  20 minutes

## 2024-07-16 ENCOUNTER — TELEMEDICINE (OUTPATIENT)
Dept: BEHAVIORAL/MENTAL HEALTH CLINIC | Facility: CLINIC | Age: 59
End: 2024-07-16
Payer: MEDICARE

## 2024-07-16 DIAGNOSIS — F31.81 BIPOLAR II DISORDER (HCC): Primary | ICD-10-CM

## 2024-07-16 PROCEDURE — 90834 PSYTX W PT 45 MINUTES: CPT | Performed by: PSYCHOLOGIST

## 2024-07-16 NOTE — BH TREATMENT PLAN
Outpatient Behavioral Health Psychotherapy Treatment Plan     Sindy Frank  1965      Date of Initial Psychotherapy Assessment: 5/17/23   Date of Current Treatment Plan: 09/18/23  Treatment Plan Target Date: review in six month  Treatment Plan Expiration Date: review in six months     Diagnosis:   1. Bipolar affective disorder, currently depressed, mild (HCC)                Area(s) of Need: client had stroke and has some cognitive deficits, Anxiety makes it difficult for her to go out of the house, Sometimes.      Long Term Goal 1 (in the client's own words):  I want to use my exercise bike and use the asha to track my progress.  I want to look for a place where I would feel comfortable volunteering.    Mainly, I want to get my meds straightened out.     Stage of Change: Preparation     Target Date for completion: Review in six months             Anticipated therapeutic modalities: Cognitive behavioral therapy, supportive therapy,              People identified to complete this goal: Sindy                    Objective 1: (identify the means of measuring success in meeting the objective):  I want to continue to engage in work/volunteer/social activities. I will check out the [employer] and see if there is a cooking opportunity there for me.                    Objective 2: (identify the means of measuring success in meeting the objective): I want to continue to reach out to friends and accept social engagements when I can, to help me manage my anxiety and help me manage my agoraphobia. continue as of 7/16/24  I know my agoraphobia is irrational.     I am currently under the care of a [facility] psychiatric provider: yes     My [facility] psychiatric provider is: Ruma Wong     I am currently taking psychiatric medications: Yes, as prescribed     I feel that I will be ready for discharge from mental health care when I reach the following (measurable goal/objective): when I am emotionally stable     For children  and adults who have a legal guardian:          Has there been any change to custody orders and/or guardianship status? NA. If yes, attach updated documentation.     I have created my Crisis Plan and have been offered a copy of this plan     Behavioral Health Treatment Plan [facility]: Diagnosis and Treatment Plan explained to Sindy Frank acknowledges  an understanding of their diagnosis. Sindy Frank agrees to this treatment plan.     I have been offered a copy of this Treatment Plan. yes

## 2024-07-16 NOTE — PSYCH
"  Individual Therapy Session Virtual    Behavioral Health Psychotherapy Progress Note    Psychotherapy Provided: Individual Psychotherapy     No diagnosis found.    Goals addressed in session: Goal 1     DATA:     Reached client on amwell embedded  She has been trying to fix her sink, and has a friend coming.   Wants to get a stationary bike - so she is can get exercise. She has an asha with an ipad yo  Used [employer] [address] - but was confused by the point system.   She admitted that without the dog to \"walk\" she prefers to stay in her home. She thought about getting a membership to [employer], but she knows that she won't use it - at least not very often. She also realizes that she needs some exercise.  She is worried about having enough space in her home, but is willing to get rid of a few things in order to make the space.     Therapist listened attentively as client shared that joys and challenges of her life. Helped client sequence through daily events and gain insight into precursors for difficult emotions.  Reviewed coping skills and encouraged client to continue to use positive self care skills to help maintain physical and emotional health and vitality.  Provided empathy and support and encouraged client to continue to reach out to friends and supports.    During this session, this clinician used the following therapeutic modalities: Cognitive Behavioral Therapy, supportive therapy, trauma focused therapy such as EMDR or Progressive Counting.  Therapist used a client-centered, strengths-based approach, with mindfulness skill building, CBT-informed skills, and psycho-education, within a psychodynamic framework to address client's symptoms. Client will practice skills between sessions and will report back, during subsequent sessions regarding successes and barriers.      Substance Abuse was not addressed during this session. If the client is diagnosed with a co-occurring substance use disorder, " "please indicate any changes in the frequency or amount of use: NA. Stage of change for addressing substance use diagnoses: No substance use/Not applicable    ASSESSMENT:  Sindy presents with a Euthymic/ normal and Depressed mood.     Client's affect is Normal range and intensity, which is congruent, with their mood and the content of the session. The client has made progress on their goals.      Sindy presents with a minimal risk of suicide, minimal risk of self-harm, and minimal risk of harm to others.    For any risk assessment that surpasses a \"low\" rating, a safety plan must be developed.    A safety plan was indicated: no  If yes, describe in detail NA    PLAN: Between sessions, Sindy will practice good self care skills, track sleep and moods, and reach out to family and friends for support and empathy as well as track her sleep and set up a good schedule for herself. At the next session, the therapist will use Cognitive Behavioral Therapy, supportive therapy, trauma focused therapy or solution focused therapy to address emotional overwhelm, help client develop effective coping skills, process trauma and grief and recognize moments of antelmo and peace in their lives.     Behavioral Health Treatment Plan and Discharge Planning:  Sindy  is aware of and agrees to continue to work on their treatment plan. They have identified and are working toward their discharge goals. yes            Virtual Regular Visit    Verification of patient location:    Patient is located at Home in the following state in which I hold an active license [place]      Assessment/Plan:    Problem List Items Addressed This Visit    None      Goals addressed in session: Goal 1          Reason for visit is   Chief Complaint   Patient presents with    Virtual Regular Visit          Encounter provider Devante Ardon PsyD    Provider located at [facility] THERAPIST [facility] [place] THERAPIST MENTAL HEALTH OUTPATIENT  [address]  [facility] [address]  [phone]      Recent Visits  No visits were found meeting these conditions.  Showing recent visits within past 7 days and meeting all other requirements  Today's Visits  Date Type Provider Dept    Telemedicine Jefferson Ardon, PhD [facility] Therapist [facility]   Showing today's visits and meeting all other requirements  Future Appointments  No visits were found meeting these conditions.  Showing future appointments within next 150 days and meeting all other requirements       The patient was identified by name and date of birth.   Sindy was informed that this is a telemedicine visit and that the visit is being conducted through the Epic Embedded platform. The client agrees to proceed.  My office door was closed. No one else was in the room.  The Client acknowledged consent and understanding of privacy and security of the video platform. The patient has agreed to participate and understands they can discontinue the visit at any time.    Patient is aware this is a billable service.     Subjective  Sindy is a 59 y.o. participant who was friendly and cooperative.   Sindy  participated freely in the session and expressed appreciation at the end of the session.       HPI       Review of Systems    Video Exam    There were no vitals filed for this visit.     Physical Exam     Visit Time    0716//24  Start Time: 1402  Stop Time: 1447  Total Visit Time: 45 minutes      end

## 2024-07-24 ENCOUNTER — TELEMEDICINE (OUTPATIENT)
Dept: PSYCHIATRY | Facility: CLINIC | Age: 59
End: 2024-07-24
Payer: MEDICARE

## 2024-07-24 DIAGNOSIS — F41.1 GENERALIZED ANXIETY DISORDER: ICD-10-CM

## 2024-07-24 DIAGNOSIS — F31.81 BIPOLAR II DISORDER (HCC): Primary | ICD-10-CM

## 2024-07-24 PROCEDURE — 99214 OFFICE O/P EST MOD 30 MIN: CPT | Performed by: PSYCHIATRY & NEUROLOGY

## 2024-07-24 RX ORDER — LAMOTRIGINE 150 MG/1
150 TABLET ORAL DAILY
Qty: 90 TABLET | Refills: 0 | Status: SHIPPED | OUTPATIENT
Start: 2024-07-24

## 2024-07-24 NOTE — PSYCH
"  Virtual Regular Visit    Verification of patient location:    Patient is located at Home in the following state in which I hold an active license [place]      Assessment/Plan:    Problem List Items Addressed This Visit    None      Goals addressed in session: Goal 1          Reason for visit is   Chief Complaint   Patient presents with    Medication Management        Encounter provider Ruma Wong MD      Recent Visits  No visits were found meeting these conditions.  Showing recent visits within past 7 days and meeting all other requirements  Today's Visits  Date Type Provider Dept   07/24/24 Telemedicine Ruma Wong MD [facility]   Showing today's visits and meeting all other requirements  Future Appointments  No visits were found meeting these conditions.  Showing future appointments within next 150 days and meeting all other requirements       The patient was identified by name and date of birth. Zina Frank was informed that this is a telemedicine visit and that the visit is being conducted throughthe Epic Embedded platform. She agrees to proceed..  My office door was closed. No one else was in the room.  She acknowledged consent and understanding of privacy and security of the video platform. The patient has agreed to participate and understands they can discontinue the visit at any time.    Patient is aware this is a billable service.     Subjective  Zina Frank is a 59 y.o. female with bipolar do  and anxiety presents for f/u  .  Compliant with meds, denies SE  Pt bought exercise bike; keeps busy with her art    HPI   Mood - less depressed, but continues to have episodes of impulsive spending sometimes; spends many hrs doing art; stays up late  Pt reports she is more aware of hypomanic spx and can \" stop when I need\"  Anxiety - continues to have racing thoughts and worries, almost all the time. Denies panic attacks  Sleep - improved, more hrs, but still stays up late  Past Medical History: "   Diagnosis Date    Anxiety     Bipolar disorder (HCC)     COPD (chronic obstructive pulmonary disease) (HCC)     Hyperlipidemia     PTSD (post-traumatic stress disorder)     Seizures (HCC)        Past Surgical History:   Procedure Laterality Date    CEREBRAL ANEURYSM REPAIR      X2    COLONOSCOPY         Current Outpatient Medications   Medication Sig Dispense Refill    albuterol (PROVENTIL HFA,VENTOLIN HFA) 90 mcg/act inhaler INHALE 1 PUFF EVERY 4 TO 6 HOURS AS NEEDED      atorvastatin (LIPITOR) 40 mg tablet Take 40 mg by mouth daily      busPIRone (BUSPAR) 15 mg tablet Take 1 tablet (15 mg total) by mouth 3 (three) times a day 270 tablet 0    lamoTRIgine (LaMICtal) 100 mg tablet Take 1 tablet (100 mg total) by mouth daily 90 tablet 0    QUEtiapine (SEROquel) 400 MG tablet Take 1 tablet (400 mg total) by mouth daily at bedtime 90 tablet 0    Anoro Ellipta 62.5-25 MCG/ACT inhaler Inhale 1 puff daily      fluticasone (FLONASE) 50 mcg/act nasal spray SPRAY 1 SPRAY INTO EACH NOSTRIL EVERY DAY FOR 90 DAYS       No current facility-administered medications for this visit.        Allergies   Allergen Reactions    Naproxen GI Intolerance    Percocet [Oxycodone-Acetaminophen] GI Intolerance       Review of Systems   Constitutional:  Negative for activity change and appetite change.   Psychiatric/Behavioral:  Positive for dysphoric mood. Negative for sleep disturbance and suicidal ideas. The patient is nervous/anxious.        Video Exam    There were no vitals filed for this visit.    Physical Exam  Constitutional:       Appearance: Normal appearance.   Neurological:      Mental Status: She is alert.   Psychiatric:         Attention and Perception: Attention and perception normal.         Mood and Affect: Mood is anxious. Affect is blunt.         Speech: Speech normal.         Behavior: Behavior normal. Behavior is cooperative.         Thought Content: Thought content normal.         Judgment: Judgment normal.           Visit Time    Visit Start Time: 3.26 pm   Visit Stop Time: 3.36 pm   Total Visit Duration:  20 minutes

## 2024-07-30 ENCOUNTER — TELEMEDICINE (OUTPATIENT)
Dept: BEHAVIORAL/MENTAL HEALTH CLINIC | Facility: CLINIC | Age: 59
End: 2024-07-30
Payer: MEDICARE

## 2024-07-30 DIAGNOSIS — F31.81 BIPOLAR II DISORDER (HCC): Primary | ICD-10-CM

## 2024-07-30 PROCEDURE — 90834 PSYTX W PT 45 MINUTES: CPT | Performed by: PSYCHOLOGIST

## 2024-07-30 NOTE — PSYCH
Individual Therapy Session Virtual    Behavioral Health Psychotherapy Progress Note    Psychotherapy Provided: Individual Psychotherapy     No diagnosis found.    Goals addressed in session: Goal 1     DATA:   Reached client on amwell embedded    Side business, assemble log splitters for a company.  Take a break and hang out on . Went to the [employer] event in [place]. Helping Todd Rodriguez, going through her clothes. Has three bags of clothes. Yesterday. Papers in the morning, went to [facility] [address] - called in and reserved her spot in line. I was #38 in line. There was a misael taking all of the specialty items. Taking bags of stuff into the car. They have frozen meat, bread, cans of stuff, has points - which  each month. Went with Mingo to get her dog groomed. Stopped at [employer] - got corn and tomatoes and shared with the neighbors.     [employer] - lightening deal on [employer].   244 hours to complete her meño art (started 6/15 - ) 42 x 38  Mammogram is normal, ultrasound is good. Got her doctor to give her the patch to help her stop smoking.   Work on her stained glass. Sindy was cheerful, upbeat and talkative. She proudly showed me her meño art picture which she had finished. She spent 244 hours on it in the last two weeks.She denied being manic.  Her energy was good this session. She is planning to paint her closet and will set it up with a drop cloth.    Therapist listened attentively as client shared that joys and challenges of her life. Helped client sequence through daily events and gain insight into precursors for difficult emotions.  Reviewed coping skills and encouraged client to continue to use positive self care skills to help maintain physical and emotional health and vitality.  Provided empathy and support and encouraged client to continue to reach out to friends and supports.    During this session, this clinician used the following therapeutic modalities:  "Cognitive Behavioral Therapy, supportive therapy, trauma focused therapy such as EMDR or Progressive Counting.  Therapist used a client-centered, strengths-based approach, with mindfulness skill building, CBT-informed skills, and psycho-education, within a psychodynamic framework to address client's symptoms. Client will practice skills between sessions and will report back, during subsequent sessions regarding successes and barriers.      Substance Abuse was not addressed during this session. If the client is diagnosed with a co-occurring substance use disorder, please indicate any changes in the frequency or amount of use: NA. Stage of change for addressing substance use diagnoses: No substance use/Not applicable    ASSESSMENT:  Sindy presents with a Euthymic/ normal and Depressed mood.     Client's affect is Normal range and intensity, which is congruent, with their mood and the content of the session. The client has made progress on their goals.      Sindy presents with a minimal risk of suicide, minimal risk of self-harm, and minimal risk of harm to others.    For any risk assessment that surpasses a \"low\" rating, a safety plan must be developed.    A safety plan was indicated: no  If yes, describe in detail NA    PLAN: Between sessions, Sindy will practice good self care skills, track sleep and moods, and reach out to family and friends for support and empathy as well as set good boundaries. At the next session, the therapist will use Cognitive Behavioral Therapy, supportive therapy, trauma focused therapy or solution focused therapy to address emotional overwhelm, help client develop effective coping skills, process trauma and grief and recognize moments of antelmo and peace in their lives.     Behavioral Health Treatment Plan and Discharge Planning:  Sindy  is aware of and agrees to continue to work on their treatment plan. They have identified and are working toward their discharge goals. yes      Virtual " Regular Visit    Verification of patient location:    Patient is located at Home in the following state in which I hold an active license [place]      Assessment/Plan:    Problem List Items Addressed This Visit    None      Goals addressed in session: Goal 1          Reason for visit is   Chief Complaint   Patient presents with    Virtual Regular Visit          Encounter provider Devante Ardon PsyD    Provider located at [facility] THERAPIST [facility]  [facility] THERAPIST MENTAL HEALTH OUTPATIENT  [address] [facility] [id number] [phone]      Recent Visits  No visits were found meeting these conditions.  Showing recent visits within past 7 days and meeting all other requirements  Today's Visits  Date Type Provider Dept    Telemedicine Devante Ardon, PhD [facility] Therapist [facility]   Showing today's visits and meeting all other requirements  Future Appointments  No visits were found meeting these conditions.  Showing future appointments within next 150 days and meeting all other requirements       The patient was identified by name and date of birth.   Sindy was informed that this is a telemedicine visit and that the visit is being conducted through the Epic Embedded platform. The client agrees to proceed.  My office door was closed. No one else was in the room.  The Client acknowledged consent and understanding of privacy and security of the video platform. The patient has agreed to participate and understands they can discontinue the visit at any time.    Patient is aware this is a billable service.     Subjective  Sindy 59 is a 59 y.o. participant who was friendly and cooperative.   Sindy  participated freely in the session and expressed appreciation at the end of the session.       HPI       Review of Systems    Video Exam    There were no vitals filed for this visit.     Physical Exam   07/30/24  Start Time: 1702  Stop Time: 1752  Total Visit Time: 50 minutes  Visit  Time        end

## 2024-07-31 ENCOUNTER — TELEPHONE (OUTPATIENT)
Dept: PSYCHIATRY | Facility: CLINIC | Age: 59
End: 2024-07-31

## 2024-07-31 NOTE — TELEPHONE ENCOUNTER
Called and left message for client offering follow-up medication check appt. with Dr. Ruma Wong on 10/1 at 4:00 pm.

## 2024-08-12 ENCOUNTER — TELEMEDICINE (OUTPATIENT)
Dept: BEHAVIORAL/MENTAL HEALTH CLINIC | Facility: CLINIC | Age: 59
End: 2024-08-12
Payer: MEDICARE

## 2024-08-12 DIAGNOSIS — F31.81 BIPOLAR II DISORDER (HCC): Primary | ICD-10-CM

## 2024-08-12 PROCEDURE — 90834 PSYTX W PT 45 MINUTES: CPT | Performed by: PSYCHOLOGIST

## 2024-08-12 NOTE — PSYCH
"Individual Therapy Session Virtual    Behavioral Health Psychotherapy Progress Note    Psychotherapy Provided: Individual Psychotherapy     No diagnosis found.    Goals addressed in session: Goal 1     DATA:   Reached Sindy on amwell embedded.   Friend is going to \"cut my pay.\" But I bet she, Mingo, can't find someone else to go with her.   Mingo called me last night - I don't know- she got her rent reduced because she lost some income.   Started working on her new kit - [employer]  Waiting for a 30$ GC and 20$ GC  Frustrated about being Manic. Cleaned inside closet, will paint it next. Then ca put her clothes back in. Getting a dresser from friend, and   Mingo, who used to live in the trailer, ruined the desk. (Mingo, '' to Sindy's dad for 25 years.)    Sindy came back to take care of her father, when he was sick. He ended up in [facility].   Ken , Lobo is now \"Nicer.\"  Needs storm doors replaced. Sindy was friendly and talkative. Shared about her current frustrations and how she is addressing them. Stated that she has started her next project and is enjoying it. Shared that she sleeps failry well each night - but wakes a couple times to visit the bathroom. She agreed to track her sleep each night - to see how much she is getting on average.     Therapist listened attentively as client shared that joys and challenges of her life. Helped client sequence through daily events and gain insight into precursors for difficult emotions.  Reviewed coping skills and encouraged client to continue to use positive self care skills to help maintain physical and emotional health and vitality.  Provided empathy and support and encouraged client to continue to reach out to friends and supports.    During this session, this clinician used the following therapeutic modalities: Cognitive Behavioral Therapy, supportive therapy, trauma focused therapy such as EMDR or Progressive Counting.  Therapist used a " "client-centered, strengths-based approach, with mindfulness skill building, CBT-informed skills, and psycho-education, within a psychodynamic framework to address client's symptoms. Client will practice skills between sessions and will report back, during subsequent sessions regarding successes and barriers.      Substance Abuse was not addressed during this session. If the client is diagnosed with a co-occurring substance use disorder, please indicate any changes in the frequency or amount of use: NA. Stage of change for addressing substance use diagnoses: No substance use/Not applicable    ASSESSMENT:  Sindy presents with a Euthymic/ normal and Depressed mood.     Client's affect is Normal range and intensity, which is congruent, with their mood and the content of the session. The client has made progress on their goals.      Sindy presents with a minimal risk of suicide, minimal risk of self-harm, and minimal risk of harm to others.    For any risk assessment that surpasses a \"low\" rating, a safety plan must be developed.    A safety plan was indicated: no  If yes, describe in detail NA    PLAN: Between sessions, Sindy will practice good self care skills, track sleep and moods, and reach out to family and friends for support and empathy as well as be gentle with herself, and pace herself. At the next session, the therapist will use Cognitive Behavioral Therapy, supportive therapy, trauma focused therapy or solution focused therapy to address emotional overwhelm, help client develop effective coping skills, process trauma and grief and recognize moments of antelmo and peace in their lives.     Behavioral Health Treatment Plan and Discharge Planning:  Sindy  is aware of and agrees to continue to work on their treatment plan. They have identified and are working toward their discharge goals. yes      Virtual Regular Visit    Verification of patient location:    Patient is located at Home in the following state in which " I hold an active license [place]      Assessment/Plan:    Problem List Items Addressed This Visit    None      Goals addressed in session: Goal 1          Reason for visit is   Chief Complaint   Patient presents with    Virtual Regular Visit          Encounter provider Devante Ardon PsyD    Provider located at [facility] THERAPIST OP  [facility] THERAPIST MENTAL HEALTH OUTPATIENT  [address] [place] [address] [phone]      Recent Visits  No visits were found meeting these conditions.  Showing recent visits within past 7 days and meeting all other requirements  Today's Visits  Date Type Provider Dept    Telemedicine Devante Ardon, PhD [facility] Therapist op   Showing today's visits and meeting all other requirements  Future Appointments  No visits were found meeting these conditions.  Showing future appointments within next 150 days and meeting all other requirements       The patient was identified by name and date of birth.   Sindy was informed that this is a telemedicine visit and that the visit is being conducted through the Epic Embedded platform. The client agrees to proceed.  My office door was closed. No one else was in the room.  The Client acknowledged consent and understanding of privacy and security of the video platform. The patient has agreed to participate and understands they can discontinue the visit at any time.    Patient is aware this is a billable service.     Subjective  Sindy is a 59 y.o. participant who was friendly and cooperative.   Sindy  participated freely in the session and expressed appreciation at the end of the session.       HPI       Review of Systems    Video Exam    There were no vitals filed for this visit.     Physical Exam     Visit Time    08/12/24  Start Time: 1604  Stop Time: 1654  Total Visit Time: 50 minutes      end

## 2024-08-26 DIAGNOSIS — F41.1 GENERALIZED ANXIETY DISORDER: ICD-10-CM

## 2024-08-26 RX ORDER — BUSPIRONE HYDROCHLORIDE 15 MG/1
15 TABLET ORAL 3 TIMES DAILY
Qty: 270 TABLET | Refills: 1 | Status: SHIPPED | OUTPATIENT
Start: 2024-08-26

## 2024-08-26 NOTE — TELEPHONE ENCOUNTER
Reason for call:   [x] Refill   [] Prior Auth  [] Other:     Office:   [] PCP/Provider -   [x] Specialty/Provider - Psychiatry Dr Wong    Medication: buspirone     Dose/Frequency: 15 mg TID     Quantity: 90 w refill     Pharmacy: [facility]    Does the patient have enough for 3 days?   [x] Yes   [] No - Send as HP to POD

## 2024-08-28 ENCOUNTER — TELEMEDICINE (OUTPATIENT)
Dept: BEHAVIORAL/MENTAL HEALTH CLINIC | Facility: CLINIC | Age: 59
End: 2024-08-28
Payer: MEDICARE

## 2024-08-28 DIAGNOSIS — F41.1 GENERALIZED ANXIETY DISORDER: ICD-10-CM

## 2024-08-28 DIAGNOSIS — F31.81 BIPOLAR II DISORDER (HCC): Primary | ICD-10-CM

## 2024-08-28 PROCEDURE — 90834 PSYTX W PT 45 MINUTES: CPT | Performed by: PSYCHOLOGIST

## 2024-08-28 NOTE — PSYCH
Individual Therapy Session [facility]    Behavioral Health Psychotherapy Progress Note    Psychotherapy Provided: Individual Psychotherapy     No diagnosis found.    Goals addressed in session: Goal 1     DATA:   Reached on [employer]well embedded  Will go to [facility] [address] tomorrow - not sure how many points she still has for this month.   Mingo (dad's gf; do the papers with her)  Marielos- has a truck, but doesn't want to put anything in the back. Another friend has given her two pieces of furniture for her bedroom, but Marielos is not willing to put the furniture into the back of her truck.   She is concerned for her neighbor, who was taken by ambulance to the hospital. There are now trucks and equipment outside her mobile home removing the contents and likely the whole trailer. Sindy shared that it was upsetting for her to think too much about her neighbor, but wishes her the best.  Therapist listened attentively as client shared that joys and challenges of her life. Helped client sequence through daily events and gain insight into precursors for difficult emotions.  Reviewed coping skills and encouraged client to continue to use positive self care skills to help maintain physical and emotional health and vitality.  Provided empathy and support and encouraged client to continue to reach out to friends and supports.    During this session, this clinician used the following therapeutic modalities: Cognitive Behavioral Therapy, supportive therapy, trauma focused therapy such as EMDR or Progressive Counting.  Therapist used a client-centered, strengths-based approach, with mindfulness skill building, CBT-informed skills, and psycho-education, within a psychodynamic framework to address client's symptoms. Client will practice skills between sessions and will report back, during subsequent sessions regarding successes and barriers.      Substance Abuse was not addressed during this session. If the client is diagnosed with a  "co-occurring substance use disorder, please indicate any changes in the frequency or amount of use: NA. Stage of change for addressing substance use diagnoses: No substance use/Not applicable    ASSESSMENT:  Sindy presents with a Euthymic/ normal and Depressed mood.     Client's affect is Normal range and intensity, which is congruent, with their mood and the content of the session. The client has made progress on their goals.      Sindy presents with a minimal risk of suicide, minimal risk of self-harm, and minimal risk of harm to others.    For any risk assessment that surpasses a \"low\" rating, a safety plan must be developed.    A safety plan was indicated: no  If yes, describe in detail NA    PLAN: Between sessions, Sindy will practice good self care skills, track sleep and moods, and reach out to family and friends for support and empathy as well as pace herself. At the next session, the therapist will use Cognitive Behavioral Therapy, supportive therapy, trauma focused therapy or solution focused therapy to address emotional overwhelm, help client develop effective coping skills, process trauma and grief and recognize moments of antelmo and peace in their lives.     Behavioral Health Treatment Plan and Discharge Planning:  Sindy  is aware of and agrees to continue to work on their treatment plan. They have identified and are working toward their discharge goals. yes      Virtual Regular Visit    Verification of patient location:    Patient is located at Home in the following state in which I hold an active license [place]      Assessment/Plan:    Problem List Items Addressed This Visit    None      Goals addressed in session: Goal 1          Reason for visit is   Chief Complaint   Patient presents with    Virtual Regular Visit          Encounter provider Devante Ardon PsyD    Provider located at [facility] THERAPIST [facility] THERAPIST MENTAL HEALTH OUTPATIENT  [address] [place] [address] " [place] [id number] [phone]      Recent Visits  No visits were found meeting these conditions.  Showing recent visits within past 7 days and meeting all other requirements  Today's Visits  Date Type Provider Dept    Telemedicine Jefferson Ardon, PhD [facility] Therapist [facility]   Showing today's visits and meeting all other requirements  Future Appointments  No visits were found meeting these conditions.  Showing future appointments within next 150 days and meeting all other requirements       The patient was identified by name and date of birth.   Sindy was informed that this is a telemedicine visit and that the visit is being conducted through the Epic Embedded platform. The client agrees to proceed.  My office door was closed. No one else was in the room.  The Client acknowledged consent and understanding of privacy and security of the video platform. The patient has agreed to participate and understands they can discontinue the visit at any time.    Patient is aware this is a billable service.     Subjective  Yamilet is a 59 y.o. participant who was friendly and cooperative.   Sindy  participated freely in the session and expressed appreciation at the end of the session.       HPI       Review of Systems    Video Exam    There were no vitals filed for this visit.     Physical Exam     Visit Time    08/28/24  Start Time: 1206  Stop Time: 1248  Total Visit Time: 42 minutes      end

## 2024-08-29 ENCOUNTER — TELEPHONE (OUTPATIENT)
Dept: PSYCHIATRY | Facility: CLINIC | Age: 59
End: 2024-08-29

## 2024-08-29 NOTE — TELEPHONE ENCOUNTER
Called and left message for client cancelling appt. with Devante Ardon on 9/18 at 12:00 pm. and to reschedule.    Next appt. currently scheduled on 10/2 at 12:00 pm.

## 2024-09-04 DIAGNOSIS — F31.81 BIPOLAR II DISORDER (HCC): ICD-10-CM

## 2024-09-04 RX ORDER — QUETIAPINE FUMARATE 400 MG/1
400 TABLET, FILM COATED ORAL
Qty: 90 TABLET | Refills: 1 | Status: SHIPPED | OUTPATIENT
Start: 2024-09-04

## 2024-09-04 NOTE — TELEPHONE ENCOUNTER
Reason for call:   [x] Refill   [] Prior Auth  [] Other:     Office:   [] PCP/Provider -   [x] Specialty[facility]Provider - [facility]     Medication: QUEtiapine (SEROquel) 400 MG tablet     Dose/Frequency: zhanna 1 tablet (400 mg total) by mouth daily at bedtime,     Quantity: 90    Pharmacy:   [facility]/pharmacy #[id number] - [place] - [address]        Does the patient have enough for 3 days?   [] Yes   [x] No - Send as HP to POD

## 2024-10-01 ENCOUNTER — TELEMEDICINE (OUTPATIENT)
Dept: PSYCHIATRY | Facility: CLINIC | Age: 59
End: 2024-10-01
Payer: MEDICARE

## 2024-10-01 DIAGNOSIS — F41.1 GENERALIZED ANXIETY DISORDER: ICD-10-CM

## 2024-10-01 DIAGNOSIS — F31.81 BIPOLAR II DISORDER (HCC): Primary | ICD-10-CM

## 2024-10-01 PROCEDURE — 99214 OFFICE O/P EST MOD 30 MIN: CPT | Performed by: PSYCHIATRY & NEUROLOGY

## 2024-10-01 RX ORDER — QUETIAPINE FUMARATE 400 MG/1
400 TABLET, FILM COATED ORAL
Qty: 90 TABLET | Refills: 0 | Status: SHIPPED | OUTPATIENT
Start: 2024-10-01

## 2024-10-01 RX ORDER — LAMOTRIGINE 150 MG/1
150 TABLET ORAL DAILY
Qty: 90 TABLET | Refills: 0 | Status: SHIPPED | OUTPATIENT
Start: 2024-10-01

## 2024-10-01 RX ORDER — BUSPIRONE HYDROCHLORIDE 15 MG/1
15 TABLET ORAL 3 TIMES DAILY
Qty: 270 TABLET | Refills: 0 | Status: SHIPPED | OUTPATIENT
Start: 2024-10-01

## 2024-10-01 NOTE — PSYCH
"  Virtual Regular Visit    Verification of patient location:    Patient is located at Home in the following state in which I hold an active license [place]      Assessment/Plan:    Problem List Items Addressed This Visit    None      Goals addressed in session: Goal 1          Reason for visit is   Chief Complaint   Patient presents with    Medication Management        Encounter provider Ruma Wong MD      Recent Visits  No visits were found meeting these conditions.  Showing recent visits within past 7 days and meeting all other requirements  Today's Visits  Date Type Provider Dept   10/01/24 Telemedicine Ruma Wong MD [facility]   Showing today's visits and meeting all other requirements  Future Appointments  No visits were found meeting these conditions.  Showing future appointments within next 150 days and meeting all other requirements       The patient was identified by name and date of birth. Zina Frank was informed that this is a telemedicine visit and that the visit is being conducted throughthe Epic Embedded platform. She agrees to proceed..  My office door was closed. No one else was in the room.  She acknowledged consent and understanding of privacy and security of the video platform. The patient has agreed to participate and understands they can discontinue the visit at any time.    Patient is aware this is a billable service.     Subjective  Zina Frank is a 59 y.o. adult with bipolar do presents for regular f/u  .  Compliant with meds, denies SE  Pt lives alone, orders groceries online to prevent impulsive spending  Blood work for BGM is scheduled this month    HPI   Mood - continues to have mood swings- 3-4 days \" up\" with increased energy, doing crafts all day, less sleep and 2 days of low energy , less activity, sad mood. Pt reports that mood swings are less severe, \" manageable\"  Anxiety- feels uncomfortable in public places ( stores); racing thoughts sometimes  Sleep - poor, " c/o night sweats  Past Medical History:   Diagnosis Date    Anxiety     Bipolar disorder (HCC)     COPD (chronic obstructive pulmonary disease) (HCC)     Hyperlipidemia     PTSD (post-traumatic stress disorder)     Seizures (HCC)        Past Surgical History:   Procedure Laterality Date    CEREBRAL ANEURYSM REPAIR      X2    COLONOSCOPY         Current Outpatient Medications   Medication Sig Dispense Refill    albuterol (PROVENTIL HFA,VENTOLIN HFA) 90 mcg/act inhaler INHALE 1 PUFF EVERY 4 TO 6 HOURS AS NEEDED      Anoro Ellipta 62.5-25 MCG/ACT inhaler Inhale 1 puff daily      atorvastatin (LIPITOR) 40 mg tablet Take 40 mg by mouth daily      busPIRone (BUSPAR) 15 mg tablet Take 1 tablet (15 mg total) by mouth 3 (three) times a day 270 tablet 1    fluticasone (FLONASE) 50 mcg/act nasal spray SPRAY 1 SPRAY INTO EACH NOSTRIL EVERY DAY FOR 90 DAYS      lamoTRIgine (LaMICtal) 150 MG tablet Take 1 tablet (150 mg total) by mouth daily 90 tablet 0    QUEtiapine (SEROquel) 400 MG tablet Take 1 tablet (400 mg total) by mouth daily at bedtime 90 tablet 1     No current facility-administered medications for this visit.        Allergies   Allergen Reactions    Naproxen GI Intolerance    Percocet [Oxycodone-Acetaminophen] GI Intolerance       Review of Systems   Constitutional:  Positive for diaphoresis. Negative for activity change and appetite change.   Psychiatric/Behavioral:  Positive for dysphoric mood and sleep disturbance. Negative for suicidal ideas. The patient is not nervous/anxious.        Video Exam    There were no vitals filed for this visit.    Physical Exam  Constitutional:       Appearance: Normal appearance. She is normal weight.   Neurological:      Mental Status: She is alert.   Psychiatric:         Attention and Perception: Attention and perception normal.         Mood and Affect: Mood is anxious. Affect is blunt.         Behavior: Behavior normal. Behavior is cooperative.         Thought Content: Thought  content normal.         Judgment: Judgment normal.      Comments: Talkative, slightly pressured          Visit Time    Visit Start Time: 3.57 pm   Visit Stop Time: 4.07 pm   Total Visit Duration:  20 minutes

## 2024-10-02 ENCOUNTER — TELEMEDICINE (OUTPATIENT)
Dept: BEHAVIORAL/MENTAL HEALTH CLINIC | Facility: CLINIC | Age: 59
End: 2024-10-02
Payer: MEDICARE

## 2024-10-02 DIAGNOSIS — F31.81 BIPOLAR II DISORDER (HCC): Primary | ICD-10-CM

## 2024-10-02 PROCEDURE — 90834 PSYTX W PT 45 MINUTES: CPT | Performed by: PSYCHOLOGIST

## 2024-10-02 NOTE — PSYCH
Individual Therapy Session Virtual    Behavioral Health Psychotherapy Progress Note    Psychotherapy Provided: Individual Psychotherapy     No diagnosis found.    Goals addressed in session: Goal 1     DATA:   [place] [address] - food bank  110 food stamps. Rent went up. Her food stamps were dropped to $65 for food stamps  She gets paid on the 3rd of the month.   Next statement - will get a balance  Still has two little walls by closet, 6 x 6. Just needs a second coat. [employer] has the new covid vaccine.    [employer] will do custom designs - down load a picture. [employer] image creation tool.   75K points - 5-  $100 dollar gifts for [employer]. She spent some of it. Has $289 left. $20, $30, $20, $60 gift cards coming.   Study on topical pain relief study- $125 gc. Got new glasses. [employer]  tabs - less expensive and working great.  At [facility] - programs for  - volunteer - and other programs. [employer] - online orders-  there. Meat loaf or chili     Therapist listened attentively as client shared that joys and challenges of her life. Helped client sequence through daily events and gain insight into precursors for difficult emotions.  Reviewed coping skills and encouraged client to continue to use positive self care skills to help maintain physical and emotional health and vitality.  Provided empathy and support and encouraged client to continue to reach out to friends and supports.  During this session, this clinician used the following therapeutic modalities: Cognitive Behavioral Therapy, supportive therapy, trauma focused therapy such as EMDR or Progressive Counting.  Therapist used a client-centered, strengths-based approach, with mindfulness skill building, CBT-informed skills, and psycho-education, within a psychodynamic framework to address client's symptoms. Client will practice skills between sessions and will report back, during subsequent sessions regarding successes and  "barriers.      Substance Abuse was not addressed during this session. If the client is diagnosed with a co-occurring substance use disorder, please indicate any changes in the frequency or amount of use: NA. Stage of change for addressing substance use diagnoses: No substance use/Not applicable    ASSESSMENT:  Sindy presents with a Euthymic/ normal and Depressed mood.     Client's affect is Normal range and intensity, which is congruent, with their mood and the content of the session. The client has made progress on their goals.      Sindy presents with a minimal risk of suicide, minimal risk of self-harm, and minimal risk of harm to others.    For any risk assessment that surpasses a \"low\" rating, a safety plan must be developed.    A safety plan was indicated: no  If yes, describe in detail NA    PLAN: Between sessions, Sindy will practice good self care skills, track sleep and moods, and reach out to family and friends for support and empathy as well as be gentle with herself. At the next session, the therapist will use Cognitive Behavioral Therapy, supportive therapy, trauma focused therapy or solution focused therapy to address emotional overwhelm, help client develop effective coping skills, process trauma and grief and recognize moments of antelmo and peace in their lives.     Behavioral Health Treatment Plan and Discharge Planning:  Sindy  is aware of and agrees to continue to work on their treatment plan. They have identified and are working toward their discharge goals. yes      Virtual Regular Visit    Verification of patient location:    Patient is located at Home in the following state in which I hold an active license [place]      Assessment/Plan:    Problem List Items Addressed This Visit    None      Goals addressed in session: Goal 1          Reason for visit is   Chief Complaint   Patient presents with    Virtual Regular Visit          Encounter provider Devante Ardon PsyD    Provider located at [facility]" [facility] THERAPIST [facility]  [facility] THERAPIST MENTAL HEALTH OUTPATIENT  [address]  [facility] [id number] [phone]      Recent Visits  No visits were found meeting these conditions.  Showing recent visits within past 7 days and meeting all other requirements  Today's Visits  Date Type Provider Dept    Telemedicine Jefferson Ardon, PhD [facility] Therapist [facility]   Showing today's visits and meeting all other requirements  Future Appointments  No visits were found meeting these conditions.  Showing future appointments within next 150 days and meeting all other requirements       The patient was identified by name and date of birth.   Sindy was informed that this is a telemedicine visit and that the visit is being conducted through the Epic Embedded platform. The client agrees to proceed.  My office door was closed. No one else was in the room.  The Client acknowledged consent and understanding of privacy and security of the video platform. The patient has agreed to participate and understands they can discontinue the visit at any time.    Patient is aware this is a billable service.     Subjective  Sindy is a 59 y.o. participant who was friendly and cooperative.   Sindy participated freely in the session and expressed appreciation at the end of the session.       HPI       Review of Systems    Video Exam    There were no vitals filed for this visit.     Physical Exam     Visit Time    10/02/24  Start Time: 1212  Stop Time: 1301  Total Visit Time: 49 minutes      end

## 2024-10-08 ENCOUNTER — TELEPHONE (OUTPATIENT)
Dept: PSYCHIATRY | Facility: CLINIC | Age: 59
End: 2024-10-08

## 2024-10-08 NOTE — TELEPHONE ENCOUNTER
Called and left message for client offering follow-up medication check appt. with Dr. Ruma Wong on 12/23 at 4:00 pm.

## 2024-10-15 ENCOUNTER — TELEMEDICINE (OUTPATIENT)
Dept: BEHAVIORAL/MENTAL HEALTH CLINIC | Facility: CLINIC | Age: 59
End: 2024-10-15
Payer: MEDICARE

## 2024-10-15 DIAGNOSIS — F41.1 GENERALIZED ANXIETY DISORDER: Primary | ICD-10-CM

## 2024-10-15 PROCEDURE — 90834 PSYTX W PT 45 MINUTES: CPT | Performed by: PSYCHOLOGIST

## 2024-10-21 NOTE — PSYCH
Individual Therapy Session [facility]    Behavioral Health Psychotherapy Progress Note    Psychotherapy Provided: Individual Psychotherapy     No diagnosis found.    Goals addressed in session: Goal 1     DATA:   Sindy shared that she is feeling blah!  Her friend is usually late, which irritates her. She is on a very strict budget, which is also frustrating. She did share that she won a contest and was given a larger gift card to [employer]. She has selected a few things she needs to purchase and is saving the rest for now. 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 She has had to break things up and slip them into the dumpster so she is not charged by the park for transporting them to the dumpster. She has been feeling somewhat out of sorts lately, but is resting more and just taking it easy.     Therapist listened attentively as client shared that joys and challenges of her life. Helped client sequence through daily events and gain insight into precursors for difficult emotions.  Reviewed coping skills and encouraged client to continue to use positive self care skills to help maintain physical and emotional health and vitality.  Provided empathy and support and encouraged client to continue to reach out to friends and supports.    During this session, this clinician used the following therapeutic modalities: Cognitive Behavioral Therapy, supportive therapy, trauma focused therapy such as EMDR or Progressive Counting.  Therapist used a client-centered, strengths-based approach, with mindfulness skill building, CBT-informed skills, and psycho-education, within a psychodynamic framework to address client's symptoms. Client will practice skills between sessions and will report back, during subsequent sessions regarding successes and barriers.      Substance Abuse was not addressed during this session. If the client is diagnosed with a co-occurring substance  "use disorder, please indicate any changes in the frequency or amount of use: NA. Stage of change for addressing substance use diagnoses: No substance use/Not applicable    ASSESSMENT:  Sindy presents with a Euthymic/ normal and Depressed mood.     Client's affect is Normal range and intensity, which is congruent, with their mood and the content of the session. The client has made progress on their goals.      Sindy presents with a minimal risk of suicide, minimal risk of self-harm, and minimal risk of harm to others.    For any risk assessment that surpasses a \"low\" rating, a safety plan must be developed.    A safety plan was indicated: no  If yes, describe in detail NA    PLAN: Between sessions, Sindy will practice good self care skills, track sleep and moods, and reach out to family and friends for support and empathy as well as rest as she needs and walk outside as she is able. At the next session, the therapist will use Cognitive Behavioral Therapy, supportive therapy, trauma focused therapy or solution focused therapy to address emotional overwhelm, help client develop effective coping skills, process trauma and grief and recognize moments of antelmo and peace in their lives.     Behavioral Health Treatment Plan and Discharge Planning:  Sindy  is aware of and agrees to continue to work on their treatment plan. They have identified and are working toward their discharge goals. yes      Virtual Regular Visit    Verification of patient location:    Patient is located at Home in the following state in which I hold an active license [place]      Assessment/Plan:    Problem List Items Addressed This Visit    None      Goals addressed in session: Goal 1          Reason for visit is   Chief Complaint   Patient presents with    Virtual Regular Visit          Encounter provider Devante Ardon PsyD    Provider located at [facility] THERAPIST [facility] THERAPIST MENTAL HEALTH OUTPATIENT  [address] " [place]  [facility] [id number]  [phone]      Recent Visits  No visits were found meeting these conditions.  Showing recent visits within past 7 days and meeting all other requirements  Today's Visits  Date Type Provider Dept    Telemedicine Jefferson Ardon, PhD [facility] Therapist [facility]   Showing today's visits and meeting all other requirements  Future Appointments  No visits were found meeting these conditions.  Showing future appointments within next 150 days and meeting all other requirements       The patient was identified by name and date of birth.   Sindy was informed that this is a telemedicine visit and that the visit is being conducted through the Epic Embedded platform. The client agrees to proceed.  My office door was closed. No one else was in the room.  The Client acknowledged consent and understanding of privacy and security of the video platform. The patient has agreed to participate and understands they can discontinue the visit at any time.    Patient is aware this is a billable service.     Subjective  Sindy is a 59 y.o. participant who was friendly and cooperative.   Sindy  participated freely in the session and expressed appreciation at the end of the session.       HPI       Review of Systems    Video Exam    There were no vitals filed for this visit.     Physical Exam     Visit Time    10/15/24  Start Time: 1304  Stop Time: 1350  Total Visit Time: 46 minutes      end

## 2024-10-29 ENCOUNTER — TELEMEDICINE (OUTPATIENT)
Dept: BEHAVIORAL/MENTAL HEALTH CLINIC | Facility: CLINIC | Age: 59
End: 2024-10-29
Payer: MEDICARE

## 2024-10-29 DIAGNOSIS — F31.81 BIPOLAR II DISORDER (HCC): Primary | ICD-10-CM

## 2024-10-29 PROCEDURE — 90834 PSYTX W PT 45 MINUTES: CPT | Performed by: PSYCHOLOGIST

## 2024-10-29 NOTE — PSYCH
Individual Therapy Session [facility]    Behavioral Health Psychotherapy Progress Note    Psychotherapy Provided: Individual Psychotherapy     No diagnosis found.    Goals addressed in session: Goal 1     DATA:     Reached client on Epic Telehealth  Talked about her To Do List, and how frustrating it can be to make phone calls.   Frustrated with [employer], sending out directions for trash, leaves, etc, and then not following through on their own instructions.   Still doing the papers, with [employer], who is chronically always late. She has been assertive and told Mingo that she is not willing to do them for less money.  Sindy was stressed and somewhat unhappy.  Continues frustrated that things are so difficult in her life. But, still trying to make the most of each day.      Therapist listened attentively as client shared that joys and challenges of her life. Helped client sequence through daily events and gain insight into precursors for difficult emotions.  Reviewed coping skills and encouraged client to continue to use positive self care skills to help maintain physical and emotional health and vitality.  Provided empathy and support and encouraged client to continue to reach out to friends and supports.    During this session, this clinician used the following therapeutic modalities: Cognitive Behavioral Therapy, supportive therapy, trauma focused therapy such as EMDR or Progressive Counting.  Therapist used a client-centered, strengths-based approach, with mindfulness skill building, CBT-informed skills, and psycho-education, within a psychodynamic framework to address client's symptoms. Client will practice skills between sessions and will report back, during subsequent sessions regarding successes and barriers.      Substance Abuse was not addressed during this session. If the client is diagnosed with a co-occurring substance use disorder, please indicate any changes in the frequency or amount of use:  "NA. Stage of change for addressing substance use diagnoses: No substance use/Not applicable    ASSESSMENT:  Sindy presents with a Euthymic/ normal and Depressed mood.     Client's affect is Normal range and intensity, which is congruent, with their mood and the content of the session. The client has made progress on their goals.      Sindy presents with a minimal risk of suicide, minimal risk of self-harm, and minimal risk of harm to others.    For any risk assessment that surpasses a \"low\" rating, a safety plan must be developed.    A safety plan was indicated: no  If yes, describe in detail NA    PLAN: Between sessions, Sindy will practice good self care skills, track sleep and moods, and reach out to family and friends for support and empathy as well as continue with her painting, so her project is finally finished and she can feel good about that. At the next session, the therapist will use Cognitive Behavioral Therapy, supportive therapy, trauma focused therapy or solution focused therapy to address emotional overwhelm, help client develop effective coping skills, process trauma and grief and recognize moments of antelmo and peace in their lives.     Behavioral Health Treatment Plan and Discharge Planning:  Sindy is aware of and agrees to continue to work on their treatment plan. They have identified and are working toward their discharge goals. yes      Virtual Regular Visit    Verification of patient location:    Patient is located at Home in the following state in which I hold an active license [place]      Assessment/Plan:    Problem List Items Addressed This Visit    None      Goals addressed in session: Goal 1          Reason for visit is   Chief Complaint   Patient presents with    Virtual Regular Visit          Encounter provider Devante Ardon PsyD    Provider located at [facility] THERAPIST [facility] THERAPIST MENTAL HEALTH OUTPATIENT  [address] [place] [address] [place] " [id number]  [phone]      Recent Visits  No visits were found meeting these conditions.  Showing recent visits within past 7 days and meeting all other requirements  Today's Visits  Date Type Provider Dept    Telemedicine Jefferson Ardon, PhD [facility] Therapist [facility]   Showing today's visits and meeting all other requirements  Future Appointments  No visits were found meeting these conditions.  Showing future appointments within next 150 days and meeting all other requirements       The patient was identified by name and date of birth.   Sindy was informed that this is a telemedicine visit and that the visit is being conducted through the Epic Embedded platform. The client agrees to proceed.  My office door was closed. No one else was in the room.  The Client acknowledged consent and understanding of privacy and security of the video platform. The patient has agreed to participate and understands they can discontinue the visit at any time.    Patient is aware this is a billable service.     Subjective  Sindy is a 59 y.o. participant who was friendly and cooperative.   Sindy  participated freely in the session and expressed appreciation at the end of the session.       HPI       Review of Systems    Video Exam    There were no vitals filed for this visit.     Physical Exam     Visit Time  10/28/24  Start Time: 1302  Stop Time: 1349  Total Visit Time: 47 minutes      end

## 2024-11-14 ENCOUNTER — TELEMEDICINE (OUTPATIENT)
Dept: BEHAVIORAL/MENTAL HEALTH CLINIC | Facility: CLINIC | Age: 59
End: 2024-11-14
Payer: MEDICARE

## 2024-11-14 DIAGNOSIS — F31.81 BIPOLAR II DISORDER (HCC): Primary | ICD-10-CM

## 2024-11-14 PROCEDURE — 90834 PSYTX W PT 45 MINUTES: CPT | Performed by: PSYCHOLOGIST

## 2024-11-14 NOTE — PSYCH
Individual Therapy Session Virtual    Behavioral Health Psychotherapy Progress Note    Psychotherapy Provided: Individual Psychotherapy     No diagnosis found.    Goals addressed in session: Goal 1     DATA:   Not feeling well today  $70 from her asha.  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   Anh is at [employer].   Gets food from Margarita at the [facility]. But still feels bad accepting food.   Great Grandma, and brother, from first marriage.   Used to belong to [employer] that land.   Des- great grandfather's name.   Wants to volunteer at [employer], [employer],  on Thanksgiving Day.   [employer], or ladies [employer].   Continues with anxiety and depression - difficulties with sleep, frustrations with her own limitations. She shared that she is grateful for what she has, and is careful to appreciate and take care of the people and things she does have.     Therapist listened attentively as client shared that joys and challenges of her life. Helped client sequence through daily events and gain insight into precursors for difficult emotions.  Reviewed coping skills and encouraged client to continue to use positive self care skills to help maintain physical and emotional health and vitality.  Provided empathy and support and encouraged client to continue to reach out to friends and supports.    During this session, this clinician used the following therapeutic modalities: Cognitive Behavioral Therapy, supportive therapy, trauma focused therapy such as EMDR or Progressive Counting.  Therapist used a client-centered, strengths-based approach, with mindfulness skill building, CBT-informed skills, and psycho-education, within a psychodynamic framework to address client's symptoms. Client will practice skills between sessions and will report back, during subsequent sessions regarding successes and barriers.      Substance Abuse was not addressed during this session. If the client is diagnosed with  "a co-occurring substance use disorder, please indicate any changes in the frequency or amount of use: NA. Stage of change for addressing substance use diagnoses: No substance use/Not applicable    ASSESSMENT:  Sindy presents with a Euthymic/ normal and Depressed mood.     Client's affect is Normal range and intensity, which is congruent, with their mood and the content of the session. The client has made progress on their goals.      Sindy presents with a minimal risk of suicide, minimal risk of self-harm, and minimal risk of harm to others.    For any risk assessment that surpasses a \"low\" rating, a safety plan must be developed.    A safety plan was indicated: no  If yes, describe in detail NA    PLAN: Between sessions, Sindy will practice good self care skills, track sleep and moods, and reach out to family and friends for support and empathy as well as rest. At the next session, the therapist will use Cognitive Behavioral Therapy, supportive therapy, trauma focused therapy or solution focused therapy to address emotional overwhelm, help client develop effective coping skills, process trauma and grief and recognize moments of antelmo and peace in their lives.     Behavioral Health Treatment Plan and Discharge Planning:  Sindy  is aware of and agrees to continue to work on their treatment plan. They have identified and are working toward their discharge goals. yes    Virtual Regular Visit    Verification of patient location:    Patient is located at Home in the following state in which I hold an active license [place]      Assessment/Plan:    Problem List Items Addressed This Visit    None      Goals addressed in session: Goal 1          Reason for visit is   Chief Complaint   Patient presents with    Virtual Regular Visit          Encounter provider Devante Ardon PsyD    Provider located at [facility] THERAPIST [facility] THERAPIST MENTAL HEALTH OUTPATIENT  [facility] [address]  [place] " [id number]  [phone]      Recent Visits  No visits were found meeting these conditions.  Showing recent visits within past 7 days and meeting all other requirements  Today's Visits  Date Type Provider Dept    Telemedicine Jefferson Ardon, PhD [facility] Therapist [facility]   Showing today's visits and meeting all other requirements  Future Appointments  No visits were found meeting these conditions.  Showing future appointments within next 150 days and meeting all other requirements       The patient was identified by name and date of birth.   Sindy was informed that this is a telemedicine visit and that the visit is being conducted through the Epic Embedded platform. The client agrees to proceed.  My office door was closed. No one else was in the room.  The Client acknowledged consent and understanding of privacy and security of the video platform. The patient has agreed to participate and understands they can discontinue the visit at any time.    Patient is aware this is a billable service.     Subjective  Sindy is a 59 y.o. participant who was friendly and cooperative.   Sindy  participated freely in the session and expressed appreciation at the end of the session.       HPI       Review of Systems    Video Exam    There were no vitals filed for this visit.    Physical Exam     Visit Time    11/14/24  Start Time: 1300  Stop Time: 1344  Total Visit Time: 44 minutes      end

## 2024-12-03 ENCOUNTER — TELEPHONE (OUTPATIENT)
Dept: PSYCHIATRY | Facility: CLINIC | Age: 59
End: 2024-12-03

## 2024-12-03 NOTE — TELEPHONE ENCOUNTER
Patient called requesting refills for Quetiapine, Buspirone and Lamotrigine. Patient made aware medications were refilled on 10/1 for 90 day supplies with 0 refills to [facility] in [place]. Patient instructed to contact the pharmacy to obtain refills of medications. Patient verbalized understanding.

## 2024-12-23 ENCOUNTER — TELEMEDICINE (OUTPATIENT)
Dept: BEHAVIORAL/MENTAL HEALTH CLINIC | Facility: CLINIC | Age: 59
End: 2024-12-23
Payer: MEDICARE

## 2024-12-23 ENCOUNTER — TELEMEDICINE (OUTPATIENT)
Dept: PSYCHIATRY | Facility: CLINIC | Age: 59
End: 2024-12-23
Payer: MEDICARE

## 2024-12-23 DIAGNOSIS — F31.81 BIPOLAR II DISORDER (HCC): Primary | ICD-10-CM

## 2024-12-23 DIAGNOSIS — G47.00 INSOMNIA, UNSPECIFIED TYPE: ICD-10-CM

## 2024-12-23 DIAGNOSIS — F41.1 GENERALIZED ANXIETY DISORDER: ICD-10-CM

## 2024-12-23 PROCEDURE — 90834 PSYTX W PT 45 MINUTES: CPT | Performed by: PSYCHOLOGIST

## 2024-12-23 PROCEDURE — 99214 OFFICE O/P EST MOD 30 MIN: CPT | Performed by: PSYCHIATRY & NEUROLOGY

## 2024-12-23 RX ORDER — BUSPIRONE HYDROCHLORIDE 15 MG/1
15 TABLET ORAL 3 TIMES DAILY
Qty: 270 TABLET | Refills: 0 | Status: SHIPPED | OUTPATIENT
Start: 2024-12-23

## 2024-12-23 RX ORDER — QUETIAPINE FUMARATE 400 MG/1
400 TABLET, FILM COATED ORAL
Qty: 90 TABLET | Refills: 0 | Status: SHIPPED | OUTPATIENT
Start: 2024-12-23

## 2024-12-23 RX ORDER — LAMOTRIGINE 150 MG/1
150 TABLET ORAL DAILY
Qty: 90 TABLET | Refills: 0 | Status: SHIPPED | OUTPATIENT
Start: 2024-12-23

## 2024-12-23 NOTE — BH TREATMENT PLAN
Outpatient Behavioral Health Psychotherapy Treatment Plan     Sindy Frank  1965      Date of Initial Psychotherapy Assessment: 5/17/23   Date of Current Treatment Plan: 12/23/24  Treatment Plan Target Date: review in six month  Treatment Plan Expiration Date: review in six months     Diagnosis:   1. Bipolar affective disorder, currently depressed, mild (HCC)                Area(s) of Need: client had stroke and has some cognitive deficits, Anxiety makes it difficult for her to go out of the house, Sometimes.      Long Term Goal 1 (in the client's own words):  I still want to use my exercise bike I have just been busy with other things and use the asha to track my progress.  I want to look for a place where I would feel comfortable volunteering.   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     Stage of Change: Preparation     Target Date for completion: Review in six months             Anticipated therapeutic modalities: Cognitive behavioral therapy, supportive therapy,              People identified to complete this goal: Sindy                    Objective 1: (identify the means of measuring success in meeting the objective):  I want to make an apt with my Ob Gyn, I want to complete my house goals including painting the walls.  I want to get back into baking.                     Objective 2: (identify the means of measuring success in meeting the objective): I want to continue to reach out to friends and accept social engagements when I can, to help me manage my anxiety and help me manage my agoraphobia.     I am currently under the care of a [facility] psychiatric provider: yes     My [facility] psychiatric provider is: Ruma Wong     I am currently taking psychiatric medications: Yes, as prescribed     I feel that I will be ready for discharge from mental health care when I reach the following (measurable goal/objective): when I am emotionally stable     For children and adults who  have a legal guardian:          Has there been any change to custody orders and/or guardianship status? NA. If yes, attach updated documentation.     I have created my Crisis Plan and have been offered a copy of this plan     Behavioral Health Treatment Plan [facility]: Diagnosis and Treatment Plan explained to Sindy Frank acknowledges  an understanding of their diagnosis. Sindy Frank agrees to this treatment plan.     I have been offered a copy of this Treatment Plan. yes

## 2024-12-23 NOTE — PSYCH
Individual Therapy Session Virtual    Behavioral Health Psychotherapy Progress Note    Psychotherapy Provided: Individual Psychotherapy     No diagnosis found.    Goals addressed in session: Goal 1     DATA:   Reached Sindy on [employer]health  Sindy stated that she did not end up visiting with her friends for Thanksgiving. 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  Sindy appeared somewhat irritable. 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 She shared that there was a mix up with the papers, which required them to retrace their steps and fix some paperwork.  She has mixed feelings about continuing to help, but overall, is getting more out of it than it is taking out of her.  She has not done much with her cooking skills lately.  Still slightly interested in going to club house and  helping with the meal prep there - once the weather is nicer.  She is doing her crafting some, and was proud of that.   Therapist listened attentively as client shared that joys and challenges of her life. Helped client sequence through daily events and gain insight into precursors for difficult emotions.  Reviewed coping skills and encouraged client to continue to use positive self care skills to help maintain physical and emotional health and vitality.  Provided empathy and support and encouraged client to continue to reach out to friends and supports.    During this session, this clinician used the following therapeutic modalities: Cognitive Behavioral Therapy, supportive therapy, trauma focused therapy such as EMDR or Progressive Counting.  Therapist used a client-centered, strengths-based approach, with mindfulness skill building, CBT-informed skills, and psycho-education, within a psychodynamic framework to address client's symptoms. Client will practice skills between sessions and will report back, during subsequent sessions regarding  "successes and barriers.      Substance Abuse was not addressed during this session. If the client is diagnosed with a co-occurring substance use disorder, please indicate any changes in the frequency or amount of use: NA. Stage of change for addressing substance use diagnoses: No substance use/Not applicable    ASSESSMENT:  Sindy presents with a Euthymic/ normal and Depressed mood.     Client's affect is Normal range and intensity, which is congruent, with their mood and the content of the session. The client has made progress on their goals.      Sindy presents with a minimal risk of suicide, minimal risk of self-harm, and minimal risk of harm to others.    For any risk assessment that surpasses a \"low\" rating, a safety plan must be developed.    A safety plan was indicated: no  If yes, describe in detail NA    PLAN: Between sessions, Sindy will practice good self care skills, track sleep and moods, and reach out to family and friends for support and empathy as well as pace herself. At the next session, the therapist will use Cognitive Behavioral Therapy, supportive therapy, trauma focused therapy or solution focused therapy to address emotional overwhelm, help client develop effective coping skills, process trauma and grief and recognize moments of antelmo and peace in their lives.     Behavioral Health Treatment Plan and Discharge Planning:  Sindy  is aware of and agrees to continue to work on their treatment plan. They have identified and are working toward their discharge goals. yes            Virtual Regular Visit    Verification of patient location:    Patient is located at Home in the following state in which I hold an active license [place]      Assessment/Plan:    Problem List Items Addressed This Visit    None      Goals addressed in session: Goal 1          Reason for visit is   Chief Complaint   Patient presents with    Virtual Regular Visit          Encounter provider Devante Ardon PsyD    Provider located " at [facility] THERAPIST [facility]  [facility] THERAPIST MENTAL HEALTH OUTPATIENT  [address]  [facility] [address] [phone]      Recent Visits  No visits were found meeting these conditions.  Showing recent visits within past 7 days and meeting all other requirements  Today's Visits  Date Type Provider Dept    Telemedicine Jefferson Ardon, PhD [facility] Therapist [facility]   Showing today's visits and meeting all other requirements  Future Appointments  No visits were found meeting these conditions.  Showing future appointments within next 150 days and meeting all other requirements       The patient was identified by name and date of birth.   Sindy was informed that this is a telemedicine visit and that the visit is being conducted through the Epic Embedded platform. The client agrees to proceed.  My office door was closed. No one else was in the room.  The Client acknowledged consent and understanding of privacy and security of the video platform. The patient has agreed to participate and understands they can discontinue the visit at any time.    Patient is aware this is a billable service.     Subjective  Sindy is a 59 y.o. participant who was friendly and cooperative.   Sindy  participated freely in the session and expressed appreciation at the end of the session.       HPI       Review of Systems    Video Exam    There were no vitals filed for this visit.    Physical Exam     Visit Time    12/23/24  Start Time: 1404  Stop Time: 1453  Total Visit Time: 49 minutes      end

## 2024-12-23 NOTE — PSYCH
Virtual Regular Visit    Verification of patient location:    Patient is located at Home in the following state in which I hold an active license [place]      Assessment/Plan:  Assessment & Plan  Bipolar II disorder (HCC)    Orders:    lamoTRIgine (LaMICtal) 150 MG tablet; Take 1 tablet (150 mg total) by mouth daily    QUEtiapine (SEROquel) 400 MG tablet; Take 1 tablet (400 mg total) by mouth daily at bedtime    Generalized anxiety disorder    Orders:    busPIRone (BUSPAR) 15 mg tablet; Take 1 tablet (15 mg total) by mouth 3 (three) times a day    Insomnia, unspecified type  Continue current meds          Problem List Items Addressed This Visit    None  Visit Diagnoses         Bipolar II disorder (HCC)    -  Primary      Generalized anxiety disorder          Insomnia, unspecified type                Goals addressed in session: Goal 1     Depression Follow-up Plan Completed: Not applicable    Reason for visit is   Chief Complaint   Patient presents with    Medication Management        Encounter provider Ruma Wong MD      Recent Visits  No visits were found meeting these conditions.  Showing recent visits within past 7 days and meeting all other requirements  Today's Visits  Date Type Provider Dept   12/23/24 Telemedicine Ruma Wong MD [facility]   Showing today's visits and meeting all other requirements  Future Appointments  No visits were found meeting these conditions.  Showing future appointments within next 150 days and meeting all other requirements       The patient was identified by name and date of birth. Zina Frank was informed that this is a telemedicine visit and that the visit is being conducted throughthe Epic Embedded platform. She agrees to proceed..  My office door was closed. No one else was in the room.  She acknowledged consent and understanding of privacy and security of the video platform. The patient has agreed to participate and understands they can discontinue the visit at  "any time.    Patient is aware this is a billable service.     Subjective  Zina Frank is a 59 y.o. female with bipolar do and anxiety presents for regular f/u  .  Compliant with meds, denies SE  I reviewed the chart  Blood work is not done yet  Pt just got heater repaired  Holiday stress  Might go to her friend for Tiff; family conflict    HPI   Mood - continues to report mood swings, episodes of \" doing a lot\" and feeling sad sometimes  Pt does ADLs; good energy  Anxiety - increased worries \" on and off\"; denies panic attacks  Past Medical History:   Diagnosis Date    Anxiety     Bipolar disorder (HCC)     COPD (chronic obstructive pulmonary disease) ([facility])     Hyperlipidemia     PTSD (post-traumatic stress disorder)     Seizures ([facility])        Past Surgical History:   Procedure Laterality Date    CEREBRAL ANEURYSM REPAIR      X2    COLONOSCOPY         Current Outpatient Medications   Medication Sig Dispense Refill    albuterol (PROVENTIL HFA,VENTOLIN HFA) 90 mcg/act inhaler INHALE 1 PUFF EVERY 4 TO 6 HOURS AS NEEDED      Anoro Ellipta 62.5-25 MCG/ACT inhaler Inhale 1 puff daily      atorvastatin (LIPITOR) 40 mg tablet Take 40 mg by mouth daily      busPIRone (BUSPAR) 15 mg tablet Take 1 tablet (15 mg total) by mouth 3 (three) times a day 270 tablet 0    fluticasone (FLONASE) 50 mcg/act nasal spray SPRAY 1 SPRAY INTO EACH NOSTRIL EVERY DAY FOR 90 DAYS      lamoTRIgine (LaMICtal) 150 MG tablet Take 1 tablet (150 mg total) by mouth daily 90 tablet 0    QUEtiapine (SEROquel) 400 MG tablet Take 1 tablet (400 mg total) by mouth daily at bedtime 90 tablet 0     No current facility-administered medications for this visit.        Allergies   Allergen Reactions    Naproxen GI Intolerance    Percocet [Oxycodone-Acetaminophen] GI Intolerance       Review of Systems   Constitutional:  Negative for activity change and appetite change.   Psychiatric/Behavioral:  Negative for dysphoric mood, sleep disturbance and suicidal " ideas. The patient is not nervous/anxious.        Video Exam    There were no vitals filed for this visit.    Physical Exam  Constitutional:       Appearance: Normal appearance. She is normal weight.   Neurological:      Mental Status: She is alert.   Psychiatric:         Attention and Perception: Attention and perception normal.         Mood and Affect: Mood normal. Affect is blunt.         Behavior: Behavior normal. Behavior is cooperative.         Thought Content: Thought content normal.         Judgment: Judgment normal.      Comments: Circumstantial           Visit Time  Face to face  Visit Start Time: 4.00 pm   Visit Stop Time: 4.11 pm   Total Visit Duration:  22 minutes total spent in patient care

## 2025-01-23 ENCOUNTER — TELEMEDICINE (OUTPATIENT)
Dept: BEHAVIORAL/MENTAL HEALTH CLINIC | Facility: CLINIC | Age: 60
End: 2025-01-23
Payer: MEDICARE

## 2025-01-23 DIAGNOSIS — F31.81 BIPOLAR II DISORDER (HCC): Primary | ICD-10-CM

## 2025-01-23 DIAGNOSIS — F41.1 GENERALIZED ANXIETY DISORDER: ICD-10-CM

## 2025-01-23 PROCEDURE — 90832 PSYTX W PT 30 MINUTES: CPT | Performed by: PSYCHOLOGIST

## 2025-01-23 NOTE — PSYCH
Individual Therapy Session Virtual    Behavioral Health Psychotherapy Progress Note    Psychotherapy Provided: Individual Psychotherapy     No diagnosis found.    Goals addressed in session: Goal 1     DATA: Sindy had some trouble joining session - stated that she could not get her camera to work and had an error message stating that the camera did not have access.  But she was finally able to join.     Has a  coming. Kilo shoveled her side walk and car. Oil was very low, [employer] approved it, Irvin filled it; $600 from [employer]. I have to pay $397.  When you have a crisis- out- 5 days or less.     Moods- off and on - ups and downs.  Moods change daily for no apparent reason. I have a list of things to call about. Have been going to [place]. [facility]- reduce electric bill.  MAWD - credit is related to COVID.    Glass fell out of the frame. Outside window still in place. [facility].  She seemed calm today, and appeared to be handling the many challenges well today. She continues to set limits with her friend about timing of their work together, and she continues to engage in home repair projects herself, as well as her crafting.   Therapist listened attentively as client shared that joys and challenges of her life. Helped client sequence through daily events and gain insight into precursors for difficult emotions.  Reviewed coping skills and encouraged client to continue to use positive self care skills to help maintain physical and emotional health and vitality.  Provided empathy and support and encouraged client to continue to reach out to friends and supports.    During this session, this clinician used the following therapeutic modalities: Cognitive Behavioral Therapy, supportive therapy, trauma focused therapy such as EMDR or Progressive Counting.  Therapist used a client-centered, strengths-based approach, with mindfulness  "skill building, CBT-informed skills, and psycho-education, within a psychodynamic framework to address client's symptoms. Client will practice skills between sessions and will report back, during subsequent sessions regarding successes and barriers.      Substance Abuse was not addressed during this session. If the client is diagnosed with a co-occurring substance use disorder, please indicate any changes in the frequency or amount of use: NA. Stage of change for addressing substance use diagnoses: No substance use/Not applicable    ASSESSMENT:  Sindy presents with a Euthymic/ normal and Depressed mood.     Client's affect is Normal range and intensity, which is congruent, with their mood and the content of the session. The client has made progress on their goals.      Sindy presents with a minimal risk of suicide, minimal risk of self-harm, and minimal risk of harm to others.    For any risk assessment that surpasses a \"low\" rating, a safety plan must be developed.    A safety plan was indicated: no  If yes, describe in detail NA    PLAN: Between sessions, Sindy will practice good self care skills, track sleep and moods, and reach out to family and friends for support and empathy as well as rest. At the next session, the therapist will use Cognitive Behavioral Therapy, supportive therapy, trauma focused therapy or solution focused therapy to address emotional overwhelm, help client develop effective coping skills, process trauma and grief and recognize moments of antelmo and peace in their lives.     Behavioral Health Treatment Plan and Discharge Planning:  Sindy  is aware of and agrees to continue to work on their treatment plan. They have identified and are working toward their discharge goals. yes            Virtual Regular Visit    Verification of patient location:    Patient is located at Home in the following state in which I hold an active license [place]      Assessment/Plan:    Problem List Items Addressed This " Visit    None      Goals addressed in session: Goal 1          Reason for visit is   Chief Complaint   Patient presents with    Virtual Regular Visit          Encounter provider Devante Ardon PsyD    Provider located at [facility] THERAPIST OP  [facility] THERAPIST MENTAL HEALTH OUTPATIENT  [address]  [facility] [id number] [phone]      Recent Visits  No visits were found meeting these conditions.  Showing recent visits within past 7 days and meeting all other requirements  Today's Visits  Date Type Provider Dept    Telemedicine Devante Ardon, PhD [facility] Therapist op   Showing today's visits and meeting all other requirements  Future Appointments  No visits were found meeting these conditions.  Showing future appointments within next 150 days and meeting all other requirements       The patient was identified by name and date of birth.   Sindy  was informed that this is a telemedicine visit and that the visit is being conducted through the Epic Embedded platform. The client agrees to proceed.  My office door was closed. No one else was in the room.  The Client acknowledged consent and understanding of privacy and security of the video platform. The patient has agreed to participate and understands they can discontinue the visit at any time.    Patient is aware this is a billable service.     Subjective  Sindy is a 59 y.o. participant who was friendly and cooperative.   Sindy  participated freely in the session and expressed appreciation at the end of the session.       HPI       Review of Systems    Video Exam    There were no vitals filed for this visit.    Physical Exam     Visit Time    01/23/25  Start Time: 1015  Stop Time: 1049  Total Visit Time: 34 minutes      end

## 2025-02-04 ENCOUNTER — TELEMEDICINE (OUTPATIENT)
Dept: BEHAVIORAL/MENTAL HEALTH CLINIC | Facility: CLINIC | Age: 60
End: 2025-02-04
Payer: MEDICARE

## 2025-02-04 DIAGNOSIS — F31.81 BIPOLAR II DISORDER (HCC): Primary | ICD-10-CM

## 2025-02-04 DIAGNOSIS — F41.1 GENERALIZED ANXIETY DISORDER: ICD-10-CM

## 2025-02-04 PROCEDURE — 90834 PSYTX W PT 45 MINUTES: CPT | Performed by: PSYCHOLOGIST

## 2025-02-04 NOTE — PSYCH
Individual Therapy Session [employer]    Behavioral Health Psychotherapy Progress Note    Psychotherapy Provided: Individual Psychotherapy     No diagnosis found.    Goals addressed in session: Goal 1     DATA:   Reached on [employer]  Water will be shut off tomorrow for a short time for service.   MA[employer] money back  Been feeling sick, yellow mucus, not sleeping well. Sore throat on right side, then on both sides, then swollen glands, then chest and congestion. Seroquel raises blood sugar. Also eating cookies and chocolate. Craving sweets.   Up at 7:15 to do the papers.  Overall, Sindy was not feeling well, anxious about taxes, house repairs, being alone. She has some meds, and is now feeling a little better. Encouraged her to take some of her meds to help her sleep tonight.   Therapist listened attentively as client shared that joys and challenges of her life. Helped client sequence through daily events and gain insight into precursors for difficult emotions.  Reviewed coping skills and encouraged client to continue to use positive self care skills to help maintain physical and emotional health and vitality.  Provided empathy and support and encouraged client to continue to reach out to friends and supports.    During this session, this clinician used the following therapeutic modalities: Cognitive Behavioral Therapy, supportive therapy, trauma focused therapy such as EMDR or Progressive Counting.  Therapist used a client-centered, strengths-based approach, with mindfulness skill building, CBT-informed skills, and psycho-education, within a psychodynamic framework to address client's symptoms. Client will practice skills between sessions and will report back, during subsequent sessions regarding successes and barriers.      Substance Abuse was not addressed during this session. If the client is diagnosed with a co-occurring substance use disorder, please indicate any changes in the frequency or amount of use:  "NA. Stage of change for addressing substance use diagnoses: No substance use/Not applicable    ASSESSMENT:  Sindy presents with a Euthymic/ normal and Depressed mood.     Client's affect is Normal range and intensity, which is congruent, with their mood and the content of the session. The client has made progress on their goals.      Sindy presents with a minimal risk of suicide, minimal risk of self-harm, and minimal risk of harm to others.    For any risk assessment that surpasses a \"low\" rating, a safety plan must be developed.    A safety plan was indicated: no  If yes, describe in detail NA    PLAN: Between sessions, Sindy will practice good self care skills, track sleep and moods, and reach out to family and friends for support and empathy as well as rest. At the next session, the therapist will use Cognitive Behavioral Therapy, supportive therapy, trauma focused therapy or solution focused therapy to address emotional overwhelm, help client develop effective coping skills, process trauma and grief and recognize moments of antelmo and peace in their lives.     Behavioral Health Treatment Plan and Discharge Planning:  Sindy  is aware of and agrees to continue to work on their treatment plan. They have identified and are working toward their discharge goals. yes            Virtual Regular Visit    Verification of patient location:    Patient is located at Home in the following state in which I hold an active license [place]      Assessment/Plan:    Problem List Items Addressed This Visit    None      Goals addressed in session: Goal 1          Reason for visit is   Chief Complaint   Patient presents with    Virtual Regular Visit          Encounter provider Devante Ardon PsyD    Provider located at [facility] THERAPIST OP  [facility] THERAPIST MENTAL HEALTH OUTPATIENT  [address]  [facility] [place] [id number] [phone]      Recent Visits  No visits were found meeting these conditions.  Showing " recent visits within past 7 days and meeting all other requirements  Today's Visits  Date Type Provider Dept    Telemedicine Jefferson Ardon, PhD [facility] Therapist [facility]   Showing today's visits and meeting all other requirements  Future Appointments  No visits were found meeting these conditions.  Showing future appointments within next 150 days and meeting all other requirements       The patient was identified by name and date of birth.   Sindy was informed that this is a telemedicine visit and that the visit is being conducted through the Epic Embedded platform. The client agrees to proceed.  My office door was closed. No one else was in the room.  The Client acknowledged consent and understanding of privacy and security of the video platform. The patient has agreed to participate and understands they can discontinue the visit at any time.    Patient is aware this is a billable service.     Subjective  Sindy is a 59 y.o. participant who was friendly and cooperative.   Sinyd  participated freely in the session and expressed appreciation at the end of the session.       HPI       Review of Systems    Video Exam    There were no vitals filed for this visit.    Physical Exam     Visit Time    02/04/25  Start Time: 1303  Stop Time: 1346  Total Visit Time: 43 minutes      end

## 2025-02-25 DIAGNOSIS — Z79.899 ENCOUNTER FOR LONG-TERM (CURRENT) USE OF MEDICATIONS: Primary | ICD-10-CM

## 2025-03-10 ENCOUNTER — TELEPHONE (OUTPATIENT)
Age: 60
End: 2025-03-10

## 2025-03-10 NOTE — TELEPHONE ENCOUNTER
Patient is calling regarding cancelling an appointment.    Date/Time: 3.24.25 @ 4:00pm    Reason: Class    Patient was rescheduled: YES [x] NO []  If yes, when was Patient reschedule for: 3.26.25 @4:30pm    Patient requesting call back to reschedule: YES [] NO [x]

## 2025-03-11 ENCOUNTER — TELEMEDICINE (OUTPATIENT)
Dept: BEHAVIORAL/MENTAL HEALTH CLINIC | Facility: CLINIC | Age: 60
End: 2025-03-11

## 2025-03-11 DIAGNOSIS — F41.1 GENERALIZED ANXIETY DISORDER: ICD-10-CM

## 2025-03-11 DIAGNOSIS — F31.81 BIPOLAR II DISORDER (HCC): Primary | ICD-10-CM

## 2025-03-11 NOTE — PSYCH
"Behavioral Health Psychotherapy Progress Note    Psychotherapy Provided: Individual Psychotherapy     No diagnosis found.    Goals addressed in session: Goal 1     DATA: reached client on epic telehealt  She shared happily that she made it into the cooking/food safety class. She was excited, but also nervous. She is hoping that she will remember some of what she had learned before, and that it will be easier to learn this time around.     State certificate, manager,  jacket, shoes (black crocs), free chrome book  I'm a firm believer in flash cards. Showed off her new art projects, and spoke happily about the many blessings in her life.       During this session, this clinician used the following therapeutic modalities: Supportive Psychotherapy    Substance Abuse was not addressed during this session. If the client is diagnosed with a co-occurring substance use disorder, please indicate any changes in the frequency or amount of use: none. Stage of change for addressing substance use diagnoses: No substance use/Not applicable    ASSESSMENT:  Sindy Frank presents with a Euthymic/ normal mood.     her affect is Normal range and intensity, which is congruent, with her mood and the content of the session. The client has made progress on their goals.      Sindy Frank presents with a minimal risk of suicide, minimal risk of self-harm, and minimal risk of harm to others.    For any risk assessment that surpasses a \"low\" rating, a safety plan must be developed.    A safety plan was indicated: no  If yes, describe in detail NA    PLAN: Between sessions, Sindy Frank will continue to pace herself, focus on the positive, and enjoy her cooking class. . At the next session, the therapist will use Supportive Psychotherapy to address anxiety, low self esteem, and emotional overwhelm.    Behavioral Health Treatment Plan and Discharge Planning: Sindy Frank is aware of and agrees to continue to work on their treatment plan. " They have identified and are working toward their discharge goals. yes    Depression Follow-up Plan Completed: Not applicable    Visit start and stop times:    03/11/25  Start Time: 1302

## 2025-03-26 ENCOUNTER — TELEMEDICINE (OUTPATIENT)
Dept: PSYCHIATRY | Facility: CLINIC | Age: 60
End: 2025-03-26
Payer: MEDICARE

## 2025-03-26 DIAGNOSIS — F31.81 BIPOLAR II DISORDER (HCC): ICD-10-CM

## 2025-03-26 DIAGNOSIS — F41.1 GENERALIZED ANXIETY DISORDER: ICD-10-CM

## 2025-03-26 PROCEDURE — 99214 OFFICE O/P EST MOD 30 MIN: CPT | Performed by: PSYCHIATRY & NEUROLOGY

## 2025-03-26 RX ORDER — QUETIAPINE FUMARATE 400 MG/1
400 TABLET, FILM COATED ORAL
Qty: 90 TABLET | Refills: 0 | Status: SHIPPED | OUTPATIENT
Start: 2025-03-26

## 2025-03-26 RX ORDER — LAMOTRIGINE 150 MG/1
150 TABLET ORAL DAILY
Qty: 90 TABLET | Refills: 0 | Status: SHIPPED | OUTPATIENT
Start: 2025-03-26

## 2025-03-26 RX ORDER — BUSPIRONE HYDROCHLORIDE 15 MG/1
15 TABLET ORAL 3 TIMES DAILY
Qty: 270 TABLET | Refills: 0 | Status: SHIPPED | OUTPATIENT
Start: 2025-03-26

## 2025-03-26 NOTE — PSYCH
MEDICATION MANAGEMENT NOTE    Name: Zina Frank      : 1965      MRN: [id number]  Encounter Provider: Ruma Wong MD  Encounter Date: 3/26/2025   Encounter department: [facility] OUTPATIENT    Insurance: Payor: MEDICARE / Plan: MEDICARE A AND B / Product Type: Medicare A & B Fee for Service /      Reason for Visit:   Chief Complaint   Patient presents with    Medication Management   :  Assessment & Plan  Generalized anxiety disorder    Orders:    busPIRone (BUSPAR) 15 mg tablet; Take 1 tablet (15 mg total) by mouth 3 (three) times a day    Bipolar II disorder (HCC)    Orders:    lamoTRIgine (LaMICtal) 150 MG tablet; Take 1 tablet (150 mg total) by mouth daily    QUEtiapine (SEROquel) 400 MG tablet; Take 1 tablet (400 mg total) by mouth daily at bedtime        Treatment Recommendations:    Discussed self monitoring of symptoms, and symptom monitoring tools.  Discussed medications and if treatment adjustment was needed or desired.  I am scheduling this patient out for greater than 3 months: No    Medications Risks/Benefits:      Risks, Benefits And Possible Side Effects Of Medications:    Risks, benefits, and possible side effects of medications explained to Sindy and she (or legal representative) verbalizes understanding and agreement for treatment.    Controlled Medication Discussion:     Not applicable      History of Present Illness       Pt presents fort regular f/u .  Compliant with meds, denies SE  Blood work not done yet.  Pt denies acute medical problems.  Pt started to work at food bank  Mood - continues to c/o mood swings - 1 day of increased energy and activities; followed by a day of feeling tried and depressed. Pt does ADLs, and seems to function close to baseline.  Anxiety - baseline worries; denies panic attacks  Sleep - improving  Appetite- good  Review Of Systems: A review of systems is obtained and is negative except for the pertinent positives listed in  "HPI/Subjective above.      Current Rating Scores:         Areas of Improvement: reviewed in HPI/Subjective Section    Past Psychiatric History: (unchanged information from previous note copied and updated)    Long h/o bipolar do    Traumatic History: (unchanged information from previous note copied and updated)        Past Medical History:   Diagnosis Date    Anxiety     Bipolar disorder (HCC)     COPD (chronic obstructive pulmonary disease) (HCC)     Hyperlipidemia     PTSD (post-traumatic stress disorder)     Seizures (HCC)         Past Surgical History:   Procedure Laterality Date    CEREBRAL ANEURYSM REPAIR      X2    COLONOSCOPY       Allergies:   Allergies   Allergen Reactions    Naproxen GI Intolerance    Percocet [Oxycodone-Acetaminophen] GI Intolerance       Current Outpatient Medications   Medication Sig Dispense Refill    busPIRone (BUSPAR) 15 mg tablet Take 1 tablet (15 mg total) by mouth 3 (three) times a day 270 tablet 0    albuterol (PROVENTIL HFA,VENTOLIN HFA) 90 mcg/act inhaler INHALE 1 PUFF EVERY 4 TO 6 HOURS AS NEEDED      Anoro Ellipta 62.5-25 MCG/ACT inhaler Inhale 1 puff daily      atorvastatin (LIPITOR) 40 mg tablet Take 40 mg by mouth daily      fluticasone (FLONASE) 50 mcg/act nasal spray SPRAY 1 SPRAY INTO EACH NOSTRIL EVERY DAY FOR 90 DAYS      lamoTRIgine (LaMICtal) 150 MG tablet Take 1 tablet (150 mg total) by mouth daily 90 tablet 0    QUEtiapine (SEROquel) 400 MG tablet Take 1 tablet (400 mg total) by mouth daily at bedtime 90 tablet 0     No current facility-administered medications for this visit.       Substance Abuse History:    Social History     Substance and Sexual Activity   Alcohol Use Yes    Alcohol/week: 4.0 standard drinks of alcohol    Types: 4 Standard drinks or equivalent per week     Social History     Substance and Sexual Activity   Drug Use Not Currently    Types: Methamphetamines, \"Crack\" cocaine    Comment: over 10 years ago       Social History:    Social History " "    Socioeconomic History    Marital status: Single     Spouse name: Not on file    Number of children: Not on file    Years of education: Not on file    Highest education level: Not on file   Occupational History    Not on file   Tobacco Use    Smoking status: Every Day     Current packs/day: 0.50     Average packs/day: 0.5 packs/day for 30.0 years (15.0 ttl pk-yrs)     Types: Cigarettes    Smokeless tobacco: Never   Vaping Use    Vaping status: Never Used   Substance and Sexual Activity    Alcohol use: Yes     Alcohol/week: 4.0 standard drinks of alcohol     Types: 4 Standard drinks or equivalent per week    Drug use: Not Currently     Types: Methamphetamines, \"Crack\" cocaine     Comment: over 10 years ago    Sexual activity: Not on file   Other Topics Concern    Not on file   Social History Narrative    Not on file     Social Drivers of Health     Financial Resource Strain: Not on file   Food Insecurity: Not on file   Transportation Needs: Not on file   Physical Activity: Not on file   Stress: Not on file   Social Connections: Not on file   Intimate Partner Violence: Not on file   Housing Stability: Not on file       Family Psychiatric History:     History reviewed. No pertinent family history.    Medical History Reviewed by provider this encounter:  Tobacco  Allergies  Meds  Problems  Med Hx  Surg Hx  Fam Hx          Objective   There were no vitals taken for this visit.     Mental Status Evaluation:    Appearance age appropriate, casually dressed   Behavior cooperative   Speech slightly pressured   Mood anxious   Affect constricted   Thought Processes circumstantial, tangential   Thought Content no overt delusions   Perceptual Disturbances: no auditory hallucinations, no visual hallucinations   Abnormal Thoughts  Risk Potential Suicidal ideation - None  Homicidal ideation - None  Potential for aggression - No   Orientation normal   Memory recent and remote memory grossly intact   Consciousness alert and " awake   Attention Span Concentration Span attention span and concentration are age appropriate   Intellect appears to be of average intelligence   Insight intact   Judgement intact   Muscle Strength and  Gait unable to assess today due to virtual visit   Motor activity unable to assess today due to virtual visit   Language normal   Fund of Knowledge normal   Pain none   Pain Scale 0       Laboratory Results: I have personally reviewed all pertinent laboratory/tests results        Suicide/Homicide Risk Assessment:    Risk of Harm to Self:  Based on today's assessment, Sindy presents the following risk of harm to self: none    Risk of Harm to Others:  Based on today's assessment, Sindy presents the following risk of harm to others: none    The following interventions are recommended: Continue medication management.    Psychotherapy Provided:     Individual psychotherapy provided: No    Treatment Plan:    Completed and signed during the session: Not applicable - Treatment Plan to be completed by [facility] therapist    Goals: Progress towards Treatment Plan goals -  stable.    Depression Follow-up Plan Completed: Not applicable    Note Share:        Administrative Statements   Administrative Statements   Encounter provider Ruma Wong MD    The Patient is located at Home and in the following state in which I hold an active license [place].    The patient was identified by name and date of birth. Zina Frank was informed that this is a telemedicine visit and that the visit is being conducted through the Epic Embedded platform. She agrees to proceed..  My office door was closed. No one else was in the room.  She acknowledged consent and understanding of privacy and security of the video platform. The patient has agreed to participate and understands they can discontinue the visit at any time.    I have spent a total time of 25 minutes in caring for this patient on the day of the visit/encounter  including Risks and benefits of tx options, Instructions for management, Documenting in the medical record, and Reviewing/placing orders in the medical record (including tests, medications, and/or procedures), not including the time spent for establishing the audio/video connection.    Visit Time  Face to face  Visit Start Time: 4.30  PM  Visit Stop Time: 4.45 PM  Total Visit Duration:  25 minutes total spent in patient care    Ruma Wong MD 03/26/25

## 2025-04-04 ENCOUNTER — TELEPHONE (OUTPATIENT)
Dept: PSYCHIATRY | Facility: CLINIC | Age: 60
End: 2025-04-04

## 2025-04-04 NOTE — TELEPHONE ENCOUNTER
Writer left voicemail scheduling client's 3 month follow up for 4 pm Monday, June 30th based on past commonly available times seen with Dr. Wong. Writer asked client to call back if time/date does not work.

## 2025-04-11 ENCOUNTER — TELEMEDICINE (OUTPATIENT)
Dept: BEHAVIORAL/MENTAL HEALTH CLINIC | Facility: CLINIC | Age: 60
End: 2025-04-11
Payer: MEDICARE

## 2025-04-11 DIAGNOSIS — F31.81 BIPOLAR II DISORDER (HCC): Primary | ICD-10-CM

## 2025-04-11 DIAGNOSIS — F41.1 GENERALIZED ANXIETY DISORDER: ICD-10-CM

## 2025-04-11 PROCEDURE — 90834 PSYTX W PT 45 MINUTES: CPT | Performed by: PSYCHOLOGIST

## 2025-04-11 NOTE — PSYCH
"Virtual Regular VisitName: Zina Frank      : 1965      MRN: [id number]  Encounter Provider: Devante Lopez PsyD  Encounter Date: 2025   Encounter department: [facility] THERAPIST MENTAL HEALTH OUTPATIENT  :  Assessment & Plan  Bipolar II disorder (HCC)         Generalized anxiety disorder             Goals addressed in session: Goal 1     DATA:   Taking the  class.  Started with 8 people, now down to 4. The students were disrespectful, and the  Sahil gave a lecture. She works with him in the [employer], ; Sandra helped with my resume. They want us to succeed. They have a lot food for us to eat.   Seroquel fog.   She wants to quite smoking -  insurance refused to pay for smoking patches any more.  Bed time midnight - 6 am.   She is excited about her class, has been studying, and is very hopeful that this will open up some new areas of expression in her life.   Therapist listened attentively, celebrated success, and provided empathy and support.       During this session, this clinician used the following therapeutic modalities: Supportive Psychotherapy    Substance Abuse was addressed during this session. If the client is diagnosed with a co-occurring substance use disorder, please indicate any changes in the frequency or amount of use: smoking daily. Stage of change for addressing substance use diagnoses: Preparation asking for the patch so that she can stop. Has the second and third step patches. Smoking 15 cigs per day.     ASSESSMENT:  Zina presents with a Euthymic/ normal mood. Zina's affect is Normal range and intensity, which is congruent, with their mood and the content of the session. The client has made progress on their goals as evidenced by attending therapy.    Zina presents with a minimal risk of suicide, minimal risk of self-harm, and minimal risk of harm to others.    For any risk assessment that surpasses a \"low\" rating, a " safety plan must be developed.    A safety plan was indicated: no  If yes, describe in detail see attached    PLAN: Between sessions, Zina will continue to use coping skills, attend her food class, and focus on staying positive. At the next session, the therapist will use Supportive Psychotherapy to address emotional overwhelm.    Behavioral Health Treatment Plan [facility]: Diagnosis and Treatment Plan explained to Zina, Zina relates understanding diagnosis and is agreeable to Treatment Plan. Yes     Depression Follow-up Plan Completed: No     Reason for visit is No chief complaint on file.     Recent Visits  No visits were found meeting these conditions.  Showing recent visits within past 7 days and meeting all other requirements  Today's Visits  Date Type Provider Dept   04/11/25 Telemedicine Jefferson Lopez PsyD [facility] Therapist philip   Showing today's visits and meeting all other requirements  Future Appointments  No visits were found meeting these conditions.  Showing future appointments within next 150 days and meeting all other requirements     History of Present Illness     HPI    Past Medical History   Past Medical History:   Diagnosis Date    Anxiety     Bipolar disorder (HCC)     COPD (chronic obstructive pulmonary disease) (HCC)     Hyperlipidemia     PTSD (post-traumatic stress disorder)     Seizures (HCC)      Past Surgical History:   Procedure Laterality Date    CEREBRAL ANEURYSM REPAIR      X2    COLONOSCOPY       Current Outpatient Medications   Medication Instructions    albuterol (PROVENTIL HFA,VENTOLIN HFA) 90 mcg/act inhaler INHALE 1 PUFF EVERY 4 TO 6 HOURS AS NEEDED    Anoro Ellipta 62.5-25 MCG/ACT inhaler 1 puff, Inhalation, Daily    atorvastatin (LIPITOR) 40 mg, Oral, Daily    busPIRone (BUSPAR) 15 mg, Oral, 3 times daily    fluticasone (FLONASE) 50 mcg/act nasal spray SPRAY 1 SPRAY INTO EACH NOSTRIL EVERY DAY FOR 90 DAYS    lamoTRIgine (LAMICTAL) 150 mg, Oral, Daily     QUEtiapine (SEROQUEL) 400 mg, Oral, Daily at bedtime     Allergies   Allergen Reactions    Naproxen GI Intolerance    Percocet [Oxycodone-Acetaminophen] GI Intolerance       Objective   There were no vitals taken for this visit.    Video Exam  Physical Exam     Administrative Statements   Encounter provider Devante Lopez PsyD    The Patient is located at Home and in the following state in which I hold an active license [place].    The patient was identified by name and date of birth. Zina Frank was informed that this is a telemedicine visit and that the visit is being conducted through the Epic Embedded platform. She agrees to proceed..  My office door was closed. No one else was in the room.  She acknowledged consent and understanding of privacy and security of the video platform. The patient has agreed to participate and understands they can discontinue the visit at any time.    I have spent a total time of 52 minutes in caring for this patient on the day of the visit/encounter including Counseling / Coordination of care, not including the time spent for establishing the audio/video connection.    Visit Time    04/16/25  Start Time: 1402  Stop Time: 1454  Total Visit Time: 52 minutes

## 2025-06-03 ENCOUNTER — TELEMEDICINE (OUTPATIENT)
Dept: BEHAVIORAL/MENTAL HEALTH CLINIC | Facility: CLINIC | Age: 60
End: 2025-06-03
Payer: MEDICARE

## 2025-06-03 DIAGNOSIS — F31.81 BIPOLAR II DISORDER (HCC): Primary | ICD-10-CM

## 2025-06-03 DIAGNOSIS — F41.1 GENERALIZED ANXIETY DISORDER: ICD-10-CM

## 2025-06-03 PROCEDURE — 90834 PSYTX W PT 45 MINUTES: CPT | Performed by: PSYCHOLOGIST

## 2025-06-04 NOTE — PSYCH
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 She was proud of herself.   She had received two letters of reference from the two [facility] who had taught her. She was very proud of her letters. She is looking for a part time job, but wants to be careful not to take on too much.  She talked about the final \"cooking\" party and her co host and how well everything had gone.  She was positive and hopeful.      During this session, this clinician used the following therapeutic modalities: Supportive Psychotherapy    Substance Abuse was not addressed during this session. If the client is diagnosed with a co-occurring substance use disorder, please indicate any changes in the frequency or amount of use: no use. Stage of change for addressing substance use diagnoses: No substance use/Not applicable    ASSESSMENT:  Zina presents with a Euthymic/ normal mood. Zina's affect is Normal range and intensity, which is congruent, with their mood and the content of the session. The client has made progress on their goals as evidenced by able to attend her cooking class - perfect attendance.    Zina presents with a minimal risk of suicide, minimal risk of self-harm, and minimal risk of harm to others.    For any risk assessment that surpasses a \"low\" rating, a safety plan must be developed.    A safety plan was indicated: no  If yes, describe in detail see attached    PLAN: Between sessions, Zina will continue to use coping skills to manage overwhelming emotions. At the next session, the therapist will use Cognitive Behavioral Therapy to address depression and " anxiety.    Behavioral Health Treatment Plan [facility]: Diagnosis and Treatment Plan explained to Zina, Zina relates understanding diagnosis and is agreeable to Treatment Plan. Yes     Depression Follow-up Plan Completed: Yes     Reason for visit is   Chief Complaint   Patient presents with    Virtual Regular Visit      Recent Visits  Date Type Provider Dept   06/03/25 Telemedicine Devante Lopez PsyD [facility] Therapist [facility]   Showing recent visits within past 7 days and meeting all other requirements  Future Appointments  No visits were found meeting these conditions.  Showing future appointments within next 150 days and meeting all other requirements     History of Present Illness     HPI    Past Medical History   Past Medical History:   Diagnosis Date    Anxiety     Bipolar disorder (HCC)     COPD (chronic obstructive pulmonary disease) (HCC)     Hyperlipidemia     PTSD (post-traumatic stress disorder)     Seizures (HCC)      Past Surgical History:   Procedure Laterality Date    CEREBRAL ANEURYSM REPAIR      X2    COLONOSCOPY       Current Outpatient Medications   Medication Instructions    albuterol (PROVENTIL HFA,VENTOLIN HFA) 90 mcg/act inhaler INHALE 1 PUFF EVERY 4 TO 6 HOURS AS NEEDED    Anoro Ellipta 62.5-25 MCG/ACT inhaler 1 puff, Inhalation, Daily    atorvastatin (LIPITOR) 40 mg, Oral, Daily    busPIRone (BUSPAR) 15 mg, Oral, 3 times daily    fluticasone (FLONASE) 50 mcg/act nasal spray SPRAY 1 SPRAY INTO EACH NOSTRIL EVERY DAY FOR 90 DAYS    lamoTRIgine (LAMICTAL) 150 mg, Oral, Daily    QUEtiapine (SEROQUEL) 400 mg, Oral, Daily at bedtime     Allergies   Allergen Reactions    Naproxen GI Intolerance    Percocet [Oxycodone-Acetaminophen] GI Intolerance       Objective   There were no vitals taken for this visit.    Video Exam  Physical Exam     Administrative Statements   Encounter provider Devante Lopez PsyD    The Patient is located at Home and in the following state in which I  hold an active license [place].    The patient was identified by name and date of birth. Zina Frank was informed that this is a telemedicine visit and that the visit is being conducted through the Epic Embedded platform. She agrees to proceed..  My office door was closed. No one else was in the room.  She acknowledged consent and understanding of privacy and security of the video platform. The patient has agreed to participate and understands they can discontinue the visit at any time.    I have spent a total time of 38 minutes in caring for this patient on the day of the visit/encounter including Counseling / Coordination of care, not including the time spent for establishing the audio/video connection.    Visit Time   06/04/25  Start Time: 1502  Stop Time: 1540  Total Visit Time: 38 minutes

## 2025-06-19 ENCOUNTER — TELEMEDICINE (OUTPATIENT)
Dept: BEHAVIORAL/MENTAL HEALTH CLINIC | Facility: CLINIC | Age: 60
End: 2025-06-19
Payer: MEDICARE

## 2025-06-19 DIAGNOSIS — F41.1 GENERALIZED ANXIETY DISORDER: ICD-10-CM

## 2025-06-19 DIAGNOSIS — F31.81 BIPOLAR II DISORDER (HCC): Primary | ICD-10-CM

## 2025-06-19 PROCEDURE — 90837 PSYTX W PT 60 MINUTES: CPT | Performed by: PSYCHOLOGIST

## 2025-06-19 NOTE — PSYCH
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 But, she can't get through to the right department.    Wants to see if she can work Part Time.  Her anxiety has been high. [employer] in [place] is hiring.  She is not sure if she has a \"real\" interview yet, but she has made it through the first interview with  and is hoping that she will make it to the next interview.   Therapist listened with empathy and support.      During this session, this clinician used the following therapeutic modalities: Supportive Psychotherapy    Substance Abuse was not addressed during this session. If the client is diagnosed with a co-occurring substance use disorder, please indicate any changes in the frequency or amount of use: no change. Stage of change for addressing substance use diagnoses: No substance use/Not applicable    ASSESSMENT:  Zina presents with a Euthymic/ normal mood. Zina's affect is Normal range and intensity, which is congruent, with their mood and the content of the session. The client has made progress on their goals as evidenced by keeping appointments, managing emotions and attending all classes; feeling proud of herself. .    Zina presents with a minimal risk of suicide, minimal risk of self-harm, and minimal risk of harm to others.    For any risk assessment that surpasses a \"low\" rating, a safety plan must be developed.    A safety plan was indicated: no  If yes, describe in detail see attached    PLAN: Between sessions, Zina will continue to use coping skills, pace herself. At the next session, " the therapist will use Supportive Psychotherapy to address emotional overwhelm.    Behavioral Health Treatment Plan [facility]: Diagnosis and Treatment Plan explained to Zina, Zina relates understanding diagnosis and is agreeable to Treatment Plan. Yes     Depression Follow-up Plan Completed: Yes     Reason for visit is   Chief Complaint   Patient presents with    Virtual Regular Visit      Recent Visits  No visits were found meeting these conditions.  Showing recent visits within past 7 days and meeting all other requirements  Today's Visits  Date Type Provider Dept   06/19/25 Telemedicine Jefferson Lopez PsyD [facility] Therapist op   Showing today's visits and meeting all other requirements  Future Appointments  No visits were found meeting these conditions.  Showing future appointments within next 150 days and meeting all other requirements     History of Present Illness     HPI    Past Medical History   Past Medical History:   Diagnosis Date    Anxiety     Bipolar disorder (HCC)     COPD (chronic obstructive pulmonary disease) (HCC)     Hyperlipidemia     PTSD (post-traumatic stress disorder)     Seizures (HCC)      Past Surgical History:   Procedure Laterality Date    CEREBRAL ANEURYSM REPAIR      X2    COLONOSCOPY       Current Outpatient Medications   Medication Instructions    albuterol (PROVENTIL HFA,VENTOLIN HFA) 90 mcg/act inhaler INHALE 1 PUFF EVERY 4 TO 6 HOURS AS NEEDED    Anoro Ellipta 62.5-25 MCG/ACT inhaler 1 puff, Inhalation, Daily    atorvastatin (LIPITOR) 40 mg, Oral, Daily    busPIRone (BUSPAR) 15 mg, Oral, 3 times daily    fluticasone (FLONASE) 50 mcg/act nasal spray SPRAY 1 SPRAY INTO EACH NOSTRIL EVERY DAY FOR 90 DAYS    lamoTRIgine (LAMICTAL) 150 mg, Oral, Daily    QUEtiapine (SEROQUEL) 400 mg, Oral, Daily at bedtime     Allergies   Allergen Reactions    Naproxen GI Intolerance    Percocet [Oxycodone-Acetaminophen] GI Intolerance       Objective   There were no vitals taken  for this visit.    Video Exam  Physical Exam     Administrative Statements   Encounter provider Devante Lopez PsyD    The Patient is located at Home and in the following state in which I [place] an [place] license [place].    The patient was identified by name and date of birth. Zina Frank was informed that this is a telemedicine visit and that the visit is being conducted through the Epic Embedded platform. She agrees to proceed..  My office door was closed. No one else was in the room.  She acknowledged consent and understanding of privacy and security of the video platform. The patient has agreed to participate and understands they can discontinue the visit at any time.    I have spent a total time of 58 minutes in caring for this patient on the day of the visit/encounter including Counseling / Coordination of care, not including the time spent for establishing the audio/video connection.    Visit Time  06/19/25  Start Time: 1502  Stop Time: 1600  Total Visit Time: 58 minutes

## 2025-06-19 NOTE — BH TREATMENT PLAN
Outpatient Behavioral Health Psychotherapy Treatment Plan     Sindy Frank  1965      Date of Initial Psychotherapy Assessment: 5/17/23   Date of Current Treatment Plan: 6/19/25  Treatment Plan Target Date: review in six month  Treatment Plan Expiration Date: review in six months     Diagnosis:   1. Bipolar affective disorder, currently depressed, mild (HCC)                Area(s) of Need: client had stroke and has some cognitive deficits, Anxiety makes it difficult for her to go out of the house, Sometimes.      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 Make [employer].     Stage of Change: Preparation     Target Date for completion: Review in six months             Anticipated therapeutic modalities: Cognitive behavioral therapy, supportive therapy,              People identified to complete this goal: Sindy                    Objective 1: (identify the means of measuring success in meeting the objective):  I need to finish painting the walls of my house, get a quote for the painting of my shutters, so that I can manage these household tasks without having any undue anxiety.                       Objective 2: (identify the means of measuring success in meeting the objective): I want to continue to reach out to friends and accept social engagements when I can, to help me manage my anxiety and help me manage my agoraphobia.     I am currently under the care of a [facility] psychiatric provider: yes     My [facility] psychiatric provider is: Ruma Wong     I am currently taking psychiatric medications: Yes, as prescribed     I feel that I will be ready for discharge from mental health care when I reach the following (measurable goal/objective): when I am emotionally stable     For children and adults who have a legal guardian:          Has there been any change to custody orders and/or guardianship  status? NA. If yes, attach updated documentation.     I have created my Crisis Plan and have been offered a copy of this plan     Behavioral Health Treatment Plan [facility]: Diagnosis and Treatment Plan explained to Sindy Frank acknowledges  an understanding of their diagnosis. Sindy Frank agrees to this treatment plan.     I have been offered a copy of this Treatment Plan. yes

## 2025-06-30 ENCOUNTER — TELEMEDICINE (OUTPATIENT)
Dept: PSYCHIATRY | Facility: CLINIC | Age: 60
End: 2025-06-30
Payer: MEDICARE

## 2025-06-30 ENCOUNTER — TELEPHONE (OUTPATIENT)
Dept: PSYCHIATRY | Facility: CLINIC | Age: 60
End: 2025-06-30

## 2025-06-30 DIAGNOSIS — F31.81 BIPOLAR II DISORDER (HCC): ICD-10-CM

## 2025-06-30 DIAGNOSIS — F41.1 GENERALIZED ANXIETY DISORDER: ICD-10-CM

## 2025-06-30 PROCEDURE — 99214 OFFICE O/P EST MOD 30 MIN: CPT | Performed by: PSYCHIATRY & NEUROLOGY

## 2025-06-30 RX ORDER — QUETIAPINE FUMARATE 400 MG/1
400 TABLET, FILM COATED ORAL
Qty: 90 TABLET | Refills: 0 | Status: SHIPPED | OUTPATIENT
Start: 2025-06-30

## 2025-06-30 RX ORDER — BUSPIRONE HYDROCHLORIDE 15 MG/1
15 TABLET ORAL 3 TIMES DAILY
Qty: 270 TABLET | Refills: 0 | Status: SHIPPED | OUTPATIENT
Start: 2025-06-30

## 2025-06-30 RX ORDER — LAMOTRIGINE 150 MG/1
150 TABLET ORAL DAILY
Qty: 90 TABLET | Refills: 0 | Status: SHIPPED | OUTPATIENT
Start: 2025-06-30

## 2025-06-30 NOTE — TELEPHONE ENCOUNTER
Writer left voicemail for client scheduling her 3 month follow up with Dr. Wong for 9/29/25 at 4 pm based on usual date and time (Mondays at 4pm) and asked client to call back if date/time does not work.

## 2025-06-30 NOTE — PSYCH
"MEDICATION MANAGEMENT NOTE    Name: Zina Frank      : 1965      MRN: [id number]  Encounter Provider: Ruma Wong MD  Encounter Date: 2025   Encounter department: [facility] OUTPATIENT    Insurance: Payor: MEDICARE / Plan: MEDICARE A AND B / Product Type: Medicare A & B Fee for Service /      Reason for Visit:   Chief Complaint   Patient presents with    Medication Management   :  Assessment & Plan  Bipolar II disorder (HCC)    Orders:    QUEtiapine (SEROquel) 400 MG tablet; Take 1 tablet (400 mg total) by mouth daily at bedtime    lamoTRIgine (LaMICtal) 150 MG tablet; Take 1 tablet (150 mg total) by mouth daily    Generalized anxiety disorder    Orders:    busPIRone (BUSPAR) 15 mg tablet; Take 1 tablet (15 mg total) by mouth 3 (three) times a day        Treatment Recommendations:    Educated about diagnosis and treatment modalities. Verbalizes understanding and agreement with the treatment plan.  Discussed self monitoring of symptoms, and symptom monitoring tools.  Discussed medications and if treatment adjustment was needed or desired.  I am scheduling this patient out for greater than 3 months: No    Medications Risks/Benefits:      Risks, Benefits And Possible Side Effects Of Medications:    Risks, benefits, and possible side effects of medications explained to Sinyd and she (or legal representative) verbalizes understanding and agreement for treatment.    Controlled Medication Discussion:     Not applicable      History of Present Illness       Pt presents for regular f/u .  Compliant with meds, denies SE  Denies acute medical problems, blood work and f/u with PCP in september.  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 Pt goes there 3 x week - sometimes gets stressed and overwhelmed.  In therapy.  Mood - continues to report mood swings - baseline feeling either \" up\" or \" down; sometimes gets tired, c/o poor " "concentration.  She does ADLs  Anxiety - c/o feeling \" panicky\" before going to food bank - tachycardia, SOB, GI spx.   Sleep - poor sometimes  Appetite - low  Review Of Systems: A review of systems is obtained and is negative except for the pertinent positives listed in HPI/Subjective above.      Current Rating Scores:         Areas of Improvement: reviewed in HPI/Subjective Section      Past Medical History[1]  Past Surgical History[2]  Allergies: Allergies[3]    Current Outpatient Medications   Medication Instructions    albuterol (PROVENTIL HFA,VENTOLIN HFA) 90 mcg/act inhaler INHALE 1 PUFF EVERY 4 TO 6 HOURS AS NEEDED    Anoro Ellipta 62.5-25 MCG/ACT inhaler 1 puff, Inhalation, Daily    atorvastatin (LIPITOR) 40 mg, Oral, Daily    busPIRone (BUSPAR) 15 mg, Oral, 3 times daily    fluticasone (FLONASE) 50 mcg/act nasal spray SPRAY 1 SPRAY INTO EACH NOSTRIL EVERY DAY FOR 90 DAYS    lamoTRIgine (LAMICTAL) 150 mg, Oral, Daily    QUEtiapine (SEROQUEL) 400 mg, Oral, Daily at bedtime        Substance Abuse History:    Tobacco, Alcohol and Drug Use History     Tobacco Use    Smoking status: Every Day     Current packs/day: 0.50     Average packs/day: 0.5 packs/day for 30.0 years (15.0 ttl pk-yrs)     Types: Cigarettes    Smokeless tobacco: Never   Vaping Use    Vaping status: Never Used   Substance Use Topics    Alcohol use: Yes     Alcohol/week: 4.0 standard drinks of alcohol     Types: 4 Standard drinks or equivalent per week    Drug use: Not Currently     Types: Methamphetamines, \"Crack\" cocaine     Comment: over 10 years ago          Social History:    Social History     Socioeconomic History    Marital status: Single     Spouse name: Not on file    Number of children: Not on file    Years of education: Not on file    Highest education level: Not on file   Occupational History    Not on file   Other Topics Concern    Not on file   Social History Narrative    Not on file        Family Psychiatric History:     Family " History[4]    Medical History Reviewed by provider this encounter:  Tobacco  Allergies  Meds  Problems  Med Hx  Surg Hx  Fam Hx          Objective   There were no vitals taken for this visit.     Mental Status Evaluation:    Appearance age appropriate, casually dressed   Behavior cooperative   Speech normal rate, normal volume   Mood euthymic   Affect constricted   Thought Processes circumstantial, tangential   Thought Content no overt delusions   Perceptual Disturbances: no auditory hallucinations, no visual hallucinations   Abnormal Thoughts  Risk Potential Suicidal ideation - None  Homicidal ideation - None  Potential for aggression - No   Orientation grossly oriented   Memory recent and remote memory grossly intact   Consciousness alert and awake   Attention Span Concentration Span attention span and concentration are age appropriate   Intellect appears to be of average intelligence   Insight limited   Judgement limited   Muscle Strength and  Gait unable to assess today due to virtual visit   Motor activity unable to assess today due to virtual visit   Language no difficulty naming common objects   Fund of Knowledge adequate knowledge of current events       Laboratory Results: I have personally reviewed all pertinent laboratory/tests results        Suicide/Homicide Risk Assessment:    Risk of Harm to Self:  Based on today's assessment, Sindy presents the following risk of harm to self: minimal    Risk of Harm to Others:  Based on today's assessment, Sindy presents the following risk of harm to others: minimal    The following interventions are recommended: Continue medication management. Continue psychotherapy.    Psychotherapy Provided:     Individual psychotherapy provided: No    Treatment Plan:    Completed and signed during the session: Not applicable - Treatment Plan to be completed by [facility] therapist.    Goals: Progress towards Treatment Plan goals -  stable.    Depression Follow-up Plan Completed: Not applicable    Note Share:        Administrative Statements   Administrative Statements   Encounter provider Ruma Wong MD    The Patient is located at Home and in the following state in which I hold an active license [place].    The patient was identified by name and date of birth. Zina Frank was informed that this is a telemedicine visit and that the visit is being conducted through the Epic Embedded platform. She agrees to proceed..  My office door was closed. No one else was in the room.  She acknowledged consent and understanding of privacy and security of the video platform. The patient has agreed to participate and understands they can discontinue the visit at any time.    I have spent a total time of 22 minutes in caring for this patient on the day of the visit/encounter including Risks and benefits of tx options, Instructions for management, Documenting in the medical record, and Reviewing/placing orders in the medical record (including tests, medications, and/or procedures), not including the time spent for establishing the audio/video connection.    Visit Time  Face to face  Visit Start Time: 4.00 pm   Visit Stop Time: 4.10 pm   Total Visit Duration: 22 minutes total spent in patient care        Ruma Wong MD 06/30/25       [1]   Past Medical History:  Diagnosis Date    Anxiety     Bipolar disorder (HCC)     COPD (chronic obstructive pulmonary disease) (HCC)     Hyperlipidemia     PTSD (post-traumatic stress disorder)     Seizures (HCC)    [2]   Past Surgical History:  Procedure Laterality Date    CEREBRAL ANEURYSM REPAIR      X2    COLONOSCOPY     [3]   Allergies  Allergen Reactions    Naproxen GI Intolerance    Percocet [Oxycodone-Acetaminophen] GI Intolerance   [4] No family history on file.